# Patient Record
Sex: FEMALE | Race: WHITE | NOT HISPANIC OR LATINO | Employment: FULL TIME | URBAN - METROPOLITAN AREA
[De-identification: names, ages, dates, MRNs, and addresses within clinical notes are randomized per-mention and may not be internally consistent; named-entity substitution may affect disease eponyms.]

---

## 2023-03-17 ENCOUNTER — HOSPITAL ENCOUNTER (INPATIENT)
Facility: HOSPITAL | Age: 58
LOS: 1 days | Discharge: HOME | End: 2023-03-20
Attending: EMERGENCY MEDICINE | Admitting: INTERNAL MEDICINE

## 2023-03-17 ENCOUNTER — APPOINTMENT (OUTPATIENT)
Dept: RADIOLOGY | Facility: HOSPITAL | Age: 58
End: 2023-03-17

## 2023-03-17 DIAGNOSIS — J44.1 COPD EXACERBATION (HCC): Primary | ICD-10-CM

## 2023-03-17 DIAGNOSIS — Z72.0 NICOTINE ABUSE: ICD-10-CM

## 2023-03-17 PROBLEM — F12.90 MARIJUANA USE: Status: ACTIVE | Noted: 2023-03-17

## 2023-03-17 PROBLEM — I10 HYPERTENSION: Status: ACTIVE | Noted: 2023-03-17

## 2023-03-17 PROBLEM — E78.5 HYPERLIPIDEMIA: Status: ACTIVE | Noted: 2023-03-17

## 2023-03-17 LAB
2HR DELTA HS TROPONIN: -1 NG/L
ALBUMIN SERPL BCP-MCNC: 4.3 G/DL (ref 3.5–5)
ALP SERPL-CCNC: 73 U/L (ref 34–104)
ALT SERPL W P-5'-P-CCNC: 31 U/L (ref 7–52)
ANION GAP SERPL CALCULATED.3IONS-SCNC: 8 MMOL/L (ref 4–13)
AST SERPL W P-5'-P-CCNC: 23 U/L (ref 13–39)
BASOPHILS # BLD AUTO: 0.11 THOUSANDS/ÂΜL (ref 0–0.1)
BASOPHILS NFR BLD AUTO: 1 % (ref 0–1)
BILIRUB SERPL-MCNC: 0.68 MG/DL (ref 0.2–1)
BUN SERPL-MCNC: 14 MG/DL (ref 5–25)
CALCIUM SERPL-MCNC: 9.9 MG/DL (ref 8.4–10.2)
CARDIAC TROPONIN I PNL SERPL HS: 3 NG/L
CARDIAC TROPONIN I PNL SERPL HS: 4 NG/L
CHLORIDE SERPL-SCNC: 104 MMOL/L (ref 96–108)
CO2 SERPL-SCNC: 29 MMOL/L (ref 21–32)
CREAT SERPL-MCNC: 0.8 MG/DL (ref 0.6–1.3)
D DIMER PPP FEU-MCNC: 0.73 UG/ML FEU
EOSINOPHIL # BLD AUTO: 0.74 THOUSAND/ÂΜL (ref 0–0.61)
EOSINOPHIL NFR BLD AUTO: 7 % (ref 0–6)
ERYTHROCYTE [DISTWIDTH] IN BLOOD BY AUTOMATED COUNT: 13.2 % (ref 11.6–15.1)
FLUAV RNA RESP QL NAA+PROBE: NEGATIVE
FLUBV RNA RESP QL NAA+PROBE: NEGATIVE
GFR SERPL CREATININE-BSD FRML MDRD: 82 ML/MIN/1.73SQ M
GLUCOSE SERPL-MCNC: 88 MG/DL (ref 65–140)
HCT VFR BLD AUTO: 45.3 % (ref 34.8–46.1)
HGB BLD-MCNC: 15 G/DL (ref 11.5–15.4)
IMM GRANULOCYTES # BLD AUTO: 0.07 THOUSAND/UL (ref 0–0.2)
IMM GRANULOCYTES NFR BLD AUTO: 1 % (ref 0–2)
LYMPHOCYTES # BLD AUTO: 2.28 THOUSANDS/ÂΜL (ref 0.6–4.47)
LYMPHOCYTES NFR BLD AUTO: 22 % (ref 14–44)
MCH RBC QN AUTO: 32.4 PG (ref 26.8–34.3)
MCHC RBC AUTO-ENTMCNC: 33.1 G/DL (ref 31.4–37.4)
MCV RBC AUTO: 98 FL (ref 82–98)
MONOCYTES # BLD AUTO: 0.95 THOUSAND/ÂΜL (ref 0.17–1.22)
MONOCYTES NFR BLD AUTO: 9 % (ref 4–12)
NEUTROPHILS # BLD AUTO: 6.28 THOUSANDS/ÂΜL (ref 1.85–7.62)
NEUTS SEG NFR BLD AUTO: 60 % (ref 43–75)
NRBC BLD AUTO-RTO: 0 /100 WBCS
PLATELET # BLD AUTO: 314 THOUSANDS/UL (ref 149–390)
PMV BLD AUTO: 10.4 FL (ref 8.9–12.7)
POTASSIUM SERPL-SCNC: 4.2 MMOL/L (ref 3.5–5.3)
PROCALCITONIN SERPL-MCNC: <0.05 NG/ML
PROT SERPL-MCNC: 7.3 G/DL (ref 6.4–8.4)
RBC # BLD AUTO: 4.63 MILLION/UL (ref 3.81–5.12)
RSV RNA RESP QL NAA+PROBE: NEGATIVE
SARS-COV-2 RNA RESP QL NAA+PROBE: NEGATIVE
SODIUM SERPL-SCNC: 141 MMOL/L (ref 135–147)
WBC # BLD AUTO: 10.43 THOUSAND/UL (ref 4.31–10.16)

## 2023-03-17 RX ORDER — AZITHROMYCIN 250 MG/1
500 TABLET, FILM COATED ORAL EVERY 24 HOURS
Status: COMPLETED | OUTPATIENT
Start: 2023-03-17 | End: 2023-03-19

## 2023-03-17 RX ORDER — BENZONATATE 100 MG/1
100 CAPSULE ORAL 3 TIMES DAILY
Status: DISCONTINUED | OUTPATIENT
Start: 2023-03-17 | End: 2023-03-20 | Stop reason: HOSPADM

## 2023-03-17 RX ORDER — METHYLPREDNISOLONE SODIUM SUCCINATE 40 MG/ML
40 INJECTION, POWDER, LYOPHILIZED, FOR SOLUTION INTRAMUSCULAR; INTRAVENOUS EVERY 8 HOURS
Status: DISCONTINUED | OUTPATIENT
Start: 2023-03-18 | End: 2023-03-18

## 2023-03-17 RX ORDER — MAGNESIUM HYDROXIDE/ALUMINUM HYDROXICE/SIMETHICONE 120; 1200; 1200 MG/30ML; MG/30ML; MG/30ML
30 SUSPENSION ORAL EVERY 6 HOURS PRN
Status: DISCONTINUED | OUTPATIENT
Start: 2023-03-17 | End: 2023-03-20 | Stop reason: HOSPADM

## 2023-03-17 RX ORDER — AMLODIPINE BESYLATE 5 MG/1
5 TABLET ORAL DAILY
Status: DISCONTINUED | OUTPATIENT
Start: 2023-03-18 | End: 2023-03-20 | Stop reason: HOSPADM

## 2023-03-17 RX ORDER — LANOLIN ALCOHOL/MO/W.PET/CERES
3 CREAM (GRAM) TOPICAL
Status: DISCONTINUED | OUTPATIENT
Start: 2023-03-17 | End: 2023-03-18

## 2023-03-17 RX ORDER — ALBUTEROL SULFATE 90 UG/1
2 AEROSOL, METERED RESPIRATORY (INHALATION) EVERY 6 HOURS PRN
COMMUNITY

## 2023-03-17 RX ORDER — SODIUM CHLORIDE FOR INHALATION 0.9 %
3 VIAL, NEBULIZER (ML) INHALATION ONCE
Status: COMPLETED | OUTPATIENT
Start: 2023-03-17 | End: 2023-03-17

## 2023-03-17 RX ORDER — IPRATROPIUM BROMIDE AND ALBUTEROL SULFATE 2.5; .5 MG/3ML; MG/3ML
3 SOLUTION RESPIRATORY (INHALATION) ONCE
Status: COMPLETED | OUTPATIENT
Start: 2023-03-17 | End: 2023-03-17

## 2023-03-17 RX ORDER — ACETAMINOPHEN 325 MG/1
650 TABLET ORAL EVERY 6 HOURS PRN
Status: DISCONTINUED | OUTPATIENT
Start: 2023-03-17 | End: 2023-03-20 | Stop reason: HOSPADM

## 2023-03-17 RX ORDER — AMLODIPINE AND VALSARTAN 5; 320 MG/1; MG/1
1 TABLET ORAL DAILY
Status: DISCONTINUED | OUTPATIENT
Start: 2023-03-18 | End: 2023-03-17 | Stop reason: RX

## 2023-03-17 RX ORDER — MAGNESIUM SULFATE HEPTAHYDRATE 40 MG/ML
2 INJECTION, SOLUTION INTRAVENOUS ONCE
Status: COMPLETED | OUTPATIENT
Start: 2023-03-17 | End: 2023-03-18

## 2023-03-17 RX ORDER — LOSARTAN POTASSIUM 50 MG/1
100 TABLET ORAL DAILY
Status: DISCONTINUED | OUTPATIENT
Start: 2023-03-18 | End: 2023-03-20 | Stop reason: HOSPADM

## 2023-03-17 RX ORDER — BUDESONIDE 0.5 MG/2ML
0.5 INHALANT ORAL
Status: DISCONTINUED | OUTPATIENT
Start: 2023-03-17 | End: 2023-03-20 | Stop reason: HOSPADM

## 2023-03-17 RX ORDER — FLUTICASONE FUROATE, UMECLIDINIUM BROMIDE AND VILANTEROL TRIFENATATE 100; 62.5; 25 UG/1; UG/1; UG/1
1 POWDER RESPIRATORY (INHALATION) DAILY
COMMUNITY

## 2023-03-17 RX ORDER — GUAIFENESIN 600 MG/1
1200 TABLET, EXTENDED RELEASE ORAL 2 TIMES DAILY
Status: DISCONTINUED | OUTPATIENT
Start: 2023-03-18 | End: 2023-03-19

## 2023-03-17 RX ORDER — AMLODIPINE AND VALSARTAN 5; 320 MG/1; MG/1
1 TABLET ORAL DAILY
COMMUNITY

## 2023-03-17 RX ORDER — LEVALBUTEROL INHALATION SOLUTION 1.25 MG/3ML
1.25 SOLUTION RESPIRATORY (INHALATION)
Status: DISCONTINUED | OUTPATIENT
Start: 2023-03-17 | End: 2023-03-20 | Stop reason: HOSPADM

## 2023-03-17 RX ORDER — RIZATRIPTAN BENZOATE 10 MG/1
10 TABLET ORAL AS NEEDED
COMMUNITY

## 2023-03-17 RX ORDER — HYDROCODONE POLISTIREX AND CHLORPHENIRAMINE POLISTIREX 10; 8 MG/5ML; MG/5ML
5 SUSPENSION, EXTENDED RELEASE ORAL
Status: DISCONTINUED | OUTPATIENT
Start: 2023-03-17 | End: 2023-03-18

## 2023-03-17 RX ORDER — SODIUM CHLORIDE FOR INHALATION 0.9 %
3 VIAL, NEBULIZER (ML) INHALATION
Status: DISCONTINUED | OUTPATIENT
Start: 2023-03-17 | End: 2023-03-18

## 2023-03-17 RX ORDER — ENOXAPARIN SODIUM 100 MG/ML
40 INJECTION SUBCUTANEOUS 2 TIMES DAILY
Status: DISCONTINUED | OUTPATIENT
Start: 2023-03-18 | End: 2023-03-20 | Stop reason: HOSPADM

## 2023-03-17 RX ORDER — NICOTINE 21 MG/24HR
14 PATCH, TRANSDERMAL 24 HOURS TRANSDERMAL DAILY
Status: DISCONTINUED | OUTPATIENT
Start: 2023-03-18 | End: 2023-03-20 | Stop reason: HOSPADM

## 2023-03-17 RX ORDER — METHYLPREDNISOLONE SODIUM SUCCINATE 125 MG/2ML
125 INJECTION, POWDER, LYOPHILIZED, FOR SOLUTION INTRAMUSCULAR; INTRAVENOUS ONCE
Status: COMPLETED | OUTPATIENT
Start: 2023-03-17 | End: 2023-03-17

## 2023-03-17 RX ADMIN — IPRATROPIUM BROMIDE AND ALBUTEROL SULFATE 3 ML: 2.5; .5 SOLUTION RESPIRATORY (INHALATION) at 17:40

## 2023-03-17 RX ADMIN — BENZONATATE 100 MG: 100 CAPSULE ORAL at 22:39

## 2023-03-17 RX ADMIN — AZITHROMYCIN MONOHYDRATE 500 MG: 250 TABLET ORAL at 22:58

## 2023-03-17 RX ADMIN — ISODIUM CHLORIDE 3 ML: 0.03 SOLUTION RESPIRATORY (INHALATION) at 19:01

## 2023-03-17 RX ADMIN — MAGNESIUM SULFATE HEPTAHYDRATE 2 G: 40 INJECTION, SOLUTION INTRAVENOUS at 22:57

## 2023-03-17 RX ADMIN — IPRATROPIUM BROMIDE 0.5 MG: 0.5 SOLUTION RESPIRATORY (INHALATION) at 19:01

## 2023-03-17 RX ADMIN — METHYLPREDNISOLONE SODIUM SUCCINATE 125 MG: 125 INJECTION, POWDER, FOR SOLUTION INTRAMUSCULAR; INTRAVENOUS at 17:40

## 2023-03-17 RX ADMIN — ALBUTEROL SULFATE 10 MG: 2.5 SOLUTION RESPIRATORY (INHALATION) at 19:01

## 2023-03-17 NOTE — LETTER
[facility]  [facility] [address]  Dept: [phone]    March 20, 2023     Patient: Sharlett Holstein   YOB: 1965   Date of Visit: 3/17/2023       To Whom it May Concern:    Sharlett Holstein is under my professional care  She was seen in the hospital from 3/17/2023 to 03/20/23  She may return to work on 3/21/2023 without limitations  If you have any questions or concerns, please don't hesitate to call       33    Sincerely,          Mohan Patrick PA-C

## 2023-03-18 LAB
ANION GAP SERPL CALCULATED.3IONS-SCNC: 9 MMOL/L (ref 4–13)
ATRIAL RATE: 109 BPM
ATRIAL RATE: 93 BPM
BASOPHILS # BLD AUTO: 0.04 THOUSANDS/ÂΜL (ref 0–0.1)
BASOPHILS NFR BLD AUTO: 0 % (ref 0–1)
BUN SERPL-MCNC: 15 MG/DL (ref 5–25)
CALCIUM SERPL-MCNC: 9.4 MG/DL (ref 8.4–10.2)
CHLORIDE SERPL-SCNC: 104 MMOL/L (ref 96–108)
CO2 SERPL-SCNC: 23 MMOL/L (ref 21–32)
CREAT SERPL-MCNC: 0.75 MG/DL (ref 0.6–1.3)
EOSINOPHIL # BLD AUTO: 0.01 THOUSAND/ÂΜL (ref 0–0.61)
EOSINOPHIL NFR BLD AUTO: 0 % (ref 0–6)
ERYTHROCYTE [DISTWIDTH] IN BLOOD BY AUTOMATED COUNT: 13.2 % (ref 11.6–15.1)
GFR SERPL CREATININE-BSD FRML MDRD: 89 ML/MIN/1.73SQ M
GLUCOSE SERPL-MCNC: 220 MG/DL (ref 65–140)
HCT VFR BLD AUTO: 42.7 % (ref 34.8–46.1)
HGB BLD-MCNC: 14.2 G/DL (ref 11.5–15.4)
IMM GRANULOCYTES # BLD AUTO: 0.09 THOUSAND/UL (ref 0–0.2)
IMM GRANULOCYTES NFR BLD AUTO: 1 % (ref 0–2)
LYMPHOCYTES # BLD AUTO: 0.85 THOUSANDS/ÂΜL (ref 0.6–4.47)
LYMPHOCYTES NFR BLD AUTO: 8 % (ref 14–44)
MCH RBC QN AUTO: 32.3 PG (ref 26.8–34.3)
MCHC RBC AUTO-ENTMCNC: 33.3 G/DL (ref 31.4–37.4)
MCV RBC AUTO: 97 FL (ref 82–98)
MONOCYTES # BLD AUTO: 0.26 THOUSAND/ÂΜL (ref 0.17–1.22)
MONOCYTES NFR BLD AUTO: 2 % (ref 4–12)
NEUTROPHILS # BLD AUTO: 10.05 THOUSANDS/ÂΜL (ref 1.85–7.62)
NEUTS SEG NFR BLD AUTO: 89 % (ref 43–75)
NRBC BLD AUTO-RTO: 0 /100 WBCS
P AXIS: 62 DEGREES
P AXIS: 72 DEGREES
PLATELET # BLD AUTO: 319 THOUSANDS/UL (ref 149–390)
PMV BLD AUTO: 10.8 FL (ref 8.9–12.7)
POTASSIUM SERPL-SCNC: 4.3 MMOL/L (ref 3.5–5.3)
PR INTERVAL: 138 MS
PR INTERVAL: 142 MS
PROCALCITONIN SERPL-MCNC: <0.05 NG/ML
QRS AXIS: 66 DEGREES
QRS AXIS: 76 DEGREES
QRSD INTERVAL: 88 MS
QRSD INTERVAL: 90 MS
QT INTERVAL: 350 MS
QT INTERVAL: 366 MS
QTC INTERVAL: 455 MS
QTC INTERVAL: 471 MS
RBC # BLD AUTO: 4.39 MILLION/UL (ref 3.81–5.12)
SODIUM SERPL-SCNC: 136 MMOL/L (ref 135–147)
T WAVE AXIS: 59 DEGREES
T WAVE AXIS: 71 DEGREES
VENTRICULAR RATE: 109 BPM
VENTRICULAR RATE: 93 BPM
WBC # BLD AUTO: 11.3 THOUSAND/UL (ref 4.31–10.16)

## 2023-03-18 RX ORDER — METHYLPREDNISOLONE SODIUM SUCCINATE 125 MG/2ML
60 INJECTION, POWDER, LYOPHILIZED, FOR SOLUTION INTRAMUSCULAR; INTRAVENOUS EVERY 8 HOURS
Status: DISCONTINUED | OUTPATIENT
Start: 2023-03-18 | End: 2023-03-19

## 2023-03-18 RX ORDER — LANOLIN ALCOHOL/MO/W.PET/CERES
6 CREAM (GRAM) TOPICAL
Status: DISCONTINUED | OUTPATIENT
Start: 2023-03-18 | End: 2023-03-20 | Stop reason: HOSPADM

## 2023-03-18 RX ORDER — HYDROCODONE POLISTIREX AND CHLORPHENIRAMINE POLISTIREX 10; 8 MG/5ML; MG/5ML
5 SUSPENSION, EXTENDED RELEASE ORAL EVERY 8 HOURS PRN
Status: DISCONTINUED | OUTPATIENT
Start: 2023-03-18 | End: 2023-03-20 | Stop reason: HOSPADM

## 2023-03-18 RX ADMIN — IPRATROPIUM BROMIDE 0.5 MG: 0.5 SOLUTION RESPIRATORY (INHALATION) at 14:27

## 2023-03-18 RX ADMIN — ENOXAPARIN SODIUM 40 MG: 40 INJECTION SUBCUTANEOUS at 18:32

## 2023-03-18 RX ADMIN — METHYLPREDNISOLONE SODIUM SUCCINATE 40 MG: 40 INJECTION, POWDER, FOR SOLUTION INTRAMUSCULAR; INTRAVENOUS at 05:00

## 2023-03-18 RX ADMIN — LEVALBUTEROL HYDROCHLORIDE 1.25 MG: 1.25 SOLUTION RESPIRATORY (INHALATION) at 07:50

## 2023-03-18 RX ADMIN — LEVALBUTEROL HYDROCHLORIDE 1.25 MG: 1.25 SOLUTION RESPIRATORY (INHALATION) at 00:07

## 2023-03-18 RX ADMIN — BUDESONIDE 0.5 MG: 0.5 INHALANT ORAL at 07:50

## 2023-03-18 RX ADMIN — HYDROCODONE POLISTIREX AND CHLORPHENIRAMINE POLISTIREX 5 ML: 10; 8 SUSPENSION, EXTENDED RELEASE ORAL at 01:19

## 2023-03-18 RX ADMIN — MELATONIN 3 MG: at 01:19

## 2023-03-18 RX ADMIN — IPRATROPIUM BROMIDE 0.5 MG: 0.5 SOLUTION RESPIRATORY (INHALATION) at 20:26

## 2023-03-18 RX ADMIN — GUAIFENESIN 1200 MG: 600 TABLET ORAL at 08:46

## 2023-03-18 RX ADMIN — HYDROCODONE POLISTIREX AND CHLORPHENIRAMINE POLISTIREX 5 ML: 10; 8 SUSPENSION, EXTENDED RELEASE ORAL at 14:00

## 2023-03-18 RX ADMIN — BENZONATATE 100 MG: 100 CAPSULE ORAL at 08:46

## 2023-03-18 RX ADMIN — MULTIPLE VITAMINS W/ MINERALS TAB 1 TABLET: TAB ORAL at 08:46

## 2023-03-18 RX ADMIN — ENOXAPARIN SODIUM 40 MG: 40 INJECTION SUBCUTANEOUS at 08:45

## 2023-03-18 RX ADMIN — IPRATROPIUM BROMIDE 0.5 MG: 0.5 SOLUTION RESPIRATORY (INHALATION) at 07:49

## 2023-03-18 RX ADMIN — Medication 14 MG: at 08:47

## 2023-03-18 RX ADMIN — BUDESONIDE 0.5 MG: 0.5 INHALANT ORAL at 20:26

## 2023-03-18 RX ADMIN — IPRATROPIUM BROMIDE 0.5 MG: 0.5 SOLUTION RESPIRATORY (INHALATION) at 00:06

## 2023-03-18 RX ADMIN — BENZONATATE 100 MG: 100 CAPSULE ORAL at 15:18

## 2023-03-18 RX ADMIN — GUAIFENESIN 1200 MG: 600 TABLET ORAL at 18:32

## 2023-03-18 RX ADMIN — AMLODIPINE BESYLATE 5 MG: 5 TABLET ORAL at 08:46

## 2023-03-18 RX ADMIN — AZITHROMYCIN MONOHYDRATE 500 MG: 250 TABLET ORAL at 21:21

## 2023-03-18 RX ADMIN — LEVALBUTEROL HYDROCHLORIDE 1.25 MG: 1.25 SOLUTION RESPIRATORY (INHALATION) at 14:27

## 2023-03-18 RX ADMIN — BENZONATATE 100 MG: 100 CAPSULE ORAL at 21:21

## 2023-03-18 RX ADMIN — METHYLPREDNISOLONE SODIUM SUCCINATE 60 MG: 125 INJECTION, POWDER, FOR SOLUTION INTRAMUSCULAR; INTRAVENOUS at 21:21

## 2023-03-18 RX ADMIN — LEVALBUTEROL HYDROCHLORIDE 1.25 MG: 1.25 SOLUTION RESPIRATORY (INHALATION) at 20:26

## 2023-03-18 RX ADMIN — LOSARTAN POTASSIUM 100 MG: 50 TABLET, FILM COATED ORAL at 08:46

## 2023-03-18 RX ADMIN — ISODIUM CHLORIDE 3 ML: 0.03 SOLUTION RESPIRATORY (INHALATION) at 00:06

## 2023-03-18 RX ADMIN — METHYLPREDNISOLONE SODIUM SUCCINATE 60 MG: 125 INJECTION, POWDER, FOR SOLUTION INTRAMUSCULAR; INTRAVENOUS at 13:59

## 2023-03-18 NOTE — ASSESSMENT & PLAN NOTE
Admitted with sudden onset of shortness of breath on 317  She had no relief with Trelegy or albuterol nebs at home  Her chest x-ray was unremarkable on admission and she was started on IV Solu-Medrol     · Continue IV Solu-Medrol  · Increased frequency of cough suppressant as this is her biggest challenge currently  · She remains off of supplemental oxygen  · Viral swab negative

## 2023-03-18 NOTE — PLAN OF CARE
Problem: RESPIRATORY - ADULT  Goal: Achieves optimal ventilation and oxygenation  Description: INTERVENTIONS:  - Assess for changes in respiratory status  - Assess for changes in mentation and behavior  - Position to facilitate oxygenation and minimize respiratory effort  - Oxygen administered by appropriate delivery if ordered  - Initiate smoking cessation education as indicated  - Encourage broncho-pulmonary hygiene including cough, deep breathe, Incentive Spirometry  - Assess the need for suctioning and aspirate as needed  - Assess and instruct to report SOB or any respiratory difficulty  - Respiratory Therapy support as indicated  Outcome: Progressing     Problem: PAIN - ADULT  Goal: Verbalizes/displays adequate comfort level or baseline comfort level  Description: Interventions:  - Encourage patient to monitor pain and request assistance  - Assess pain using appropriate pain scale  - Administer analgesics based on type and severity of pain and evaluate response  - Implement non-pharmacological measures as appropriate and evaluate response  - Consider cultural and social influences on pain and pain management  - Notify physician/advanced practitioner if interventions unsuccessful or patient reports new pain  Outcome: Progressing     Problem: INFECTION - ADULT  Goal: Absence or prevention of progression during hospitalization  Description: INTERVENTIONS:  - Assess and monitor for signs and symptoms of infection  - Monitor lab/diagnostic results  - Monitor all insertion sites, i e  indwelling lines, tubes, and drains  - [place] appropriate cooling/warming therapies per order  - Administer medications as ordered  - Instruct and encourage patient and family to use good hand hygiene technique  - Identify and instruct in appropriate isolation precautions for identified infection/condition  Outcome: Progressing

## 2023-03-18 NOTE — ASSESSMENT & PLAN NOTE
· Symptom onset 3/17  No relief with trelegy, albuterol nebs at home  · CXR negative  · IV Solu-Medrol: 40mg q8  • Respiratory protocol, nebs     o Xopenex/Atrovent: TID  o Budesonide BID  • Azithromycin x 3 days  • Give IV mag x 1 dose now  • PTA: trelegy PRN, albuterol neb PRN, albuterol inhaler PRN  • Consider pulmonology eval if no improvement

## 2023-03-18 NOTE — PROGRESS NOTES
[facility]  Progress Note - Eugenia Bishop 1965, 66 y o  female MRN: [id number]  Unit/Bed#: S -01 Encounter: [id number]  Primary Care Provider: No primary care provider on file  Date and time admitted to hospital: 3/17/2023  5:18 PM    * Acute exacerbation of chronic obstructive pulmonary disease (COPD) ([facility] [address] )  Assessment & Plan  Admitted with sudden onset of shortness of breath on 317  She had no relief with Trelegy or albuterol nebs at home  Her chest x-ray was unremarkable on admission and she was started on IV Solu-Medrol  · Continue IV Solu-Medrol  · Increased frequency of cough suppressant as this is her biggest challenge currently  · She remains off of supplemental oxygen  · Viral swab negative    Hyperlipidemia  Assessment & Plan  Does not tolerate statins per outpatient notes  Nicotine abuse  Assessment & Plan  Continue replacement therapy    Hypertension  Assessment & Plan  Blood pressure stable on amlodipine and valsartan        VTE Pharmacologic Prophylaxis: VTE Score: 4 Moderate Risk (Score 3-4) - Pharmacological DVT Prophylaxis Ordered: enoxaparin (Lovenox)  Patient Centered Rounds: I performed bedside rounds with nursing staff today  Discussions with Specialists or Other Care Team Provider: ER    Education and Discussions with Family / Patient: Updated  () at bedside  Total Time Spent on Date of Encounter in care of patient: 35 minutes This time was spent on one or more of the following: performing physical exam; counseling and coordination of care; obtaining or reviewing history; documenting in the medical record; reviewing/ordering tests, medications or procedures; communicating with other healthcare professionals and discussing with patient's family/caregivers      Current Length of Stay: 0 day(s)  Current Patient Status: Observation   Certification Statement: The patient will continue to require additional inpatient hospital stay due to COPD exacerbation  Discharge Plan: Anticipate discharge in 24-48 hrs to home  Code Status: Level 1 - Full Code    Subjective:   She reports continued shortness of breath without relief overnight  She reports insomnia  She notes that she has continuous cough with taking deep breaths  She denies chest pain  She denies abdominal pain  She denies fevers or chills  She has no nasal congestion  Objective:     Vitals:   Temp (24hrs), Av °F (36 7 °C), Min:97 5 °F (36 4 °C), Max:98 4 °F (36 9 °C)    Temp:  [97 5 °F (36 4 °C)-98 4 °F (36 9 °C)] 98 °F (36 7 °C)  HR:  [] 116  Resp:  [16-20] 17  BP: (108-159)/(68-83) 108/70  SpO2:  [93 %-98 %] 95 %  Body mass index is 41 71 kg/m²  Input and Output Summary (last 24 hours):   No intake or output data in the 24 hours ending 23 1518    Physical Exam:   Physical Exam  Vitals and nursing note reviewed  Constitutional:       General: She is in acute distress  Appearance: Normal appearance  She is obese  HENT:      Head: Normocephalic  Nose: Nose normal  No congestion  Mouth/Throat:      Mouth: Mucous membranes are moist       Pharynx: Oropharynx is clear  Eyes:      General: No scleral icterus  Conjunctiva/sclera: Conjunctivae normal    Pulmonary:      Effort: Pulmonary effort is normal       Breath sounds: Wheezing present  Abdominal:      General: Bowel sounds are normal  There is no distension  Palpations: Abdomen is soft  Tenderness: There is no abdominal tenderness  Skin:     General: Skin is warm  Neurological:      Mental Status: She is alert and oriented to person, place, and time     Psychiatric:         Mood and Affect: Mood normal          Behavior: Behavior normal           Additional Data:     Labs:  Results from last 7 days   Lab Units 23  0424   WBC Thousand/uL 11 30*   HEMOGLOBIN g/dL 14 2   HEMATOCRIT % 42 7   PLATELETS Thousands/uL 319   NEUTROS PCT % 89*   LYMPHS PCT % 8* MONOS PCT % 2*   EOS PCT % 0     Results from last 7 days   Lab Units 03/18/23  0424 03/17/23  1615   SODIUM mmol/L 136 141   POTASSIUM mmol/L 4 3 4 2   CHLORIDE mmol/L 104 104   CO2 mmol/L 23 29   BUN mg/dL 15 14   CREATININE mg/dL 0 75 0 80   ANION GAP mmol/L 9 8   CALCIUM mg/dL 9 4 9 9   ALBUMIN g/dL  --  4 3   TOTAL BILIRUBIN mg/dL  --  0 68   ALK PHOS U/L  --  73   ALT U/L  --  31   AST U/L  --  23   GLUCOSE RANDOM mg/dL 220* 88                 Results from last 7 days   Lab Units 03/18/23  0424 03/17/23  1615   PROCALCITONIN ng/ml <0 05 <0 05       Lines/Drains:  Invasive Devices     Peripheral Intravenous Line  Duration           Peripheral IV 03/17/23 Right Antecubital <1 day                      Imaging: Personally reviewed the following imaging: chest xray    Recent Cultures (last 7 days):         Last 24 Hours Medication List:   Current Facility-Administered Medications   Medication Dose Route Frequency Provider Last Rate   • acetaminophen  650 mg Oral Q6H PRN DESMOND Leyva     • aluminum-magnesium hydroxide-simethicone  30 mL Oral Q6H PRN DESMOND Leyva     • losartan  100 mg Oral Daily DESMOND Leyva      And   • amLODIPine  5 mg Oral Daily DESMOND Leyva     • azithromycin  500 mg Oral Q24H DESMOND Leyva     • benzonatate  100 mg Oral TID DESMOND Leyva     • budesonide  0 5 mg Nebulization Q12H DESMOND Leyva     • enoxaparin  40 mg Subcutaneous BID DESMOND Leyva     • guaiFENesin  1,200 mg Oral BID DESMOND Leyva     • Hydrocod Hieu-Chlorphe Hieu ER  5 mL Oral Q8H PRN Dionna Dubose MD     • ipratropium  0 5 mg Nebulization TID DESMOND Leyva     • levalbuterol  1 25 mg Nebulization TID DESMOND Leyva     • melatonin  6 mg Oral HS PRN Dionna Dubose MD     • methylPREDNISolone sodium succinate  60 mg Intravenous Q8H Dionna Dubose MD     • multivitamin-minerals  1 tablet Oral Daily DESMOND Leyva     • nicotine  14 mg Transdermal Daily Irma Fair DESMOND          Today, Patient Was Seen By: Julio Pruitt MD    **Please Note: This note may have been constructed using a voice recognition system  **

## 2023-03-18 NOTE — H&P
[facility]  H&P- Niesha Reed 1965, 66 y o  female MRN: [id number]  Unit/Bed#: S -01 Encounter: [id number]  Primary Care Provider: No primary care provider on file  Date and time admitted to hospital: 3/17/2023  5:18 PM    * Acute exacerbation of chronic obstructive pulmonary disease (COPD) ([facility] [address] )  Assessment & Plan  · Symptom onset 3/17  No relief with trelegy, albuterol nebs at home  · CXR negative  · IV Solu-Medrol: 40mg q8  • Respiratory protocol, nebs  o Xopenex/Atrovent: TID  o Budesonide BID  • Azithromycin x 3 days  • Give IV mag x 1 dose now  • PTA: trelegy PRN, albuterol neb PRN, albuterol inhaler PRN  • Consider pulmonology eval if no improvement      Hyperlipidemia  Assessment & Plan  Does not tolerate statins per outpatient notes  Follow up with PCP    Nicotine abuse  Assessment & Plan  NRT, cessation    Hypertension  Assessment & Plan  BP stable on amlodipine-valsartan    VTE Pharmacologic Prophylaxis: VTE Score: 4 Moderate Risk (Score 3-4) - Pharmacological DVT Prophylaxis Ordered: enoxaparin (Lovenox)  Code Status: No Order full  Discussion with family: Patient declined call to   Anticipated Length of Stay: Patient will be admitted on an observation basis with an anticipated length of stay of less than 2 midnights secondary to IV steroids  Total Time Spent on Date of Encounter in care of patient: 75 minutes This time was spent on one or more of the following: performing physical exam; counseling and coordination of care; obtaining or reviewing history; documenting in the medical record; reviewing/ordering tests, medications or procedures; communicating with other healthcare professionals and discussing with patient's family/caregivers      Chief Complaint: shortness of breath    History of Present Illness:  Niesha Reed is a 66 y o  female with a PMH of HTN, HLD, obesity, nicotine abuse, marijuana use, who presents with shortness of breath, wheezing, coughing that began Tuesday  Resumed use of trelegy and nebulizers when she began feeling unwell on Tuesday but had no relief  Denies fevers, chest pain, palpitations, n/v/d/abdominal pain, urinary symptoms, lower extremity edema, calf tenderness  COVID, flu, RSV negative  Smokes 1 pack every 2-3 days  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, naphtha, alcohol  Has two dogs at home  She last required course of prednisone 2/8-2/16 for COPD exacerbation  Review of Systems:  Review of Systems   Respiratory: Positive for cough, shortness of breath and wheezing  Psychiatric/Behavioral: Positive for sleep disturbance  All other systems reviewed and are negative  Past Medical and Surgical History:   Past Medical History:   Diagnosis Date   • Hypertension        History reviewed  No pertinent surgical history  Meds/Allergies:  Prior to Admission medications    Not on File     I have reviewed home medications with a medical source (PCP, Pharmacy, other)  Allergies: Allergies   Allergen Reactions   • Penicillins Throat Swelling       Social History:  Marital Status: /Civil Union   Occupation:   Patient Pre-hospital Living Situation: Home  Patient Pre-hospital Level of Mobility: walks  Patient Pre-hospital Diet Restrictions:   Substance Use History:   Social History     Substance and Sexual Activity   Alcohol Use Yes    Comment: social     Social History     Tobacco Use   Smoking Status Every Day   • Packs/day: 1 50   • Types: Cigarettes   Smokeless Tobacco Never     Social History     Substance and Sexual Activity   Drug Use Yes   • Types: Marijuana       Family History:  History reviewed  No pertinent family history      Physical Exam:     Vitals:   Blood Pressure: 141/83 (03/17/23 1900)  Pulse: 95 (03/17/23 1900)  Temperature: 98 4 °F (36 9 °C) (03/17/23 1604)  Temp Source: Oral (03/17/23 1604)  Respirations: 16 (03/17/23 1900)  Height: 5' 3" (160 cm) (03/17/23 1605)  Weight - Scale: 107 kg (235 lb 7 2 oz) (03/17/23 1605)  SpO2: 96 % (03/17/23 1900)    Physical Exam  Constitutional:       General: She is not in acute distress  Appearance: Normal appearance  She is obese  She is not ill-appearing  HENT:      Head: Normocephalic and atraumatic  Right Ear: External ear normal       Left Ear: External ear normal       Nose: Nose normal       Mouth/Throat:      Pharynx: Oropharynx is clear  Eyes:      General: No scleral icterus  Extraocular Movements: Extraocular movements intact  Conjunctiva/sclera: Conjunctivae normal    Cardiovascular:      Rate and Rhythm: Normal rate and regular rhythm  Pulses: Normal pulses  Heart sounds: Normal heart sounds  Pulmonary:      Effort: No respiratory distress  Breath sounds: Wheezing (expiratory wheezes throughout lung fields) present  No rhonchi or rales  Comments: Room air  Chest:      Chest wall: No tenderness  Abdominal:      General: Bowel sounds are normal       Palpations: Abdomen is soft  Musculoskeletal:         General: No tenderness  Normal range of motion  Cervical back: Normal range of motion  Right lower leg: No edema  Left lower leg: No edema  Skin:     General: Skin is warm  Capillary Refill: Capillary refill takes less than 2 seconds  Neurological:      General: No focal deficit present  Mental Status: She is alert and oriented to person, place, and time     Psychiatric:         Mood and Affect: Mood normal          Behavior: Behavior normal           Additional Data:     Lab Results:  Results from last 7 days   Lab Units 03/17/23  1615   WBC Thousand/uL 10 43*   HEMOGLOBIN g/dL 15 0   HEMATOCRIT % 45 3   PLATELETS Thousands/uL 314   NEUTROS PCT % 60   LYMPHS PCT % 22   MONOS PCT % 9   EOS PCT % 7*     Results from last 7 days   Lab Units 03/17/23  1615   SODIUM mmol/L 141   POTASSIUM mmol/L 4 2   CHLORIDE mmol/L 104   CO2 mmol/L 29   BUN mg/dL 14   CREATININE mg/dL 0 80   ANION GAP mmol/L 8   CALCIUM mg/dL 9 9   ALBUMIN g/dL 4 3   TOTAL BILIRUBIN mg/dL 0 68   ALK PHOS U/L 73   ALT U/L 31   AST U/L 23   GLUCOSE RANDOM mg/dL 88                       Lines/Drains:  Invasive Devices     Peripheral Intravenous Line  Duration           Peripheral IV 03/17/23 Right Antecubital <1 day                    Imaging: Reviewed radiology reports from this admission including: chest xray  XR chest 1 view portable    (Results Pending)       EKG and Other Studies Reviewed on Admission:   · EKG: Sinus Tachycardia    ** Please Note: This note has been constructed using a voice recognition system   **

## 2023-03-18 NOTE — ED PROVIDER NOTES
History  Chief Complaint   Patient presents with   • Shortness of Breath     PT presents with SOB that started Tuesday with a non productive cough  PT can be heard wheezing in triage     HPI     82-year-old female presenting to the emergency department with wheezing and nonproductive cough that has only gotten worse over the past 3 days  Past medical history significant for hypertension and suspected COPD  Patient has been using her inhalers and nebulizations that she has been prescribed without proven in her symptoms  Also complains of a sharp midsternal chest pain and shortness of breath  Denies fever, chills, nausea, vomiting, abdominal pain, urinary symptoms, changes in stool, leg pain or leg swelling, rash  Prior to Admission Medications   Prescriptions Last Dose Informant Patient Reported? Taking? Calcium Citrate-Vitamin D (CALCIUM + D PO) Not Taking  Yes No   Sig: Take by mouth   Patient not taking: Reported on 3/17/2023   Multiple Vitamins-Minerals (WOMENS MULTIVITAMIN PO)   Yes Yes   Sig: Take by mouth   albuterol (PROVENTIL HFA,VENTOLIN HFA) 90 mcg/act inhaler   Yes Yes   Sig: Inhale 2 puffs every 6 (six) hours as needed for wheezing   amLODIPine-valsartan (EXFORGE) 5-320 MG per tablet   Yes Yes   Sig: Take 1 tablet by mouth daily   fluticasone-umeclidinium-vilanterol (Trelegy Ellipta) 100-62 5-25 mcg/actuation inhaler   Yes Yes   Sig: Inhale 1 puff daily Rinse mouth after use  rizatriptan (MAXALT) 10 mg tablet   Yes Yes   Sig: Take 10 mg by mouth as needed for migraine Take at the onset of migraine; if symptoms continue or return, may take another dose at least 2 hours after first dose  Take no more than 2 doses in a day  Facility-Administered Medications: None       Past Medical History:   Diagnosis Date   • Hypertension        History reviewed  No pertinent surgical history  History reviewed  No pertinent family history    I have reviewed and agree with the history as documented  E-Cigarette/Vaping   • E-Cigarette Use Never User      E-Cigarette/Vaping Substances     Social History     Tobacco Use   • Smoking status: Every Day     Packs/day: 1 50     Types: Cigarettes   • Smokeless tobacco: Never   Vaping Use   • Vaping Use: Never used   Substance Use Topics   • Alcohol use: Yes     Comment: social   • Drug use: Yes     Types: Marijuana       Review of Systems   Constitutional: Negative for chills and fever  Respiratory: Positive for cough (Nonproductive), chest tightness, shortness of breath and wheezing  Negative for apnea, choking and stridor  Cardiovascular: Positive for chest pain  Negative for palpitations and leg swelling  Gastrointestinal: Negative for abdominal distention, abdominal pain, constipation, diarrhea, nausea, rectal pain and vomiting  Genitourinary: Negative for dysuria and hematuria  Musculoskeletal: Negative for back pain, gait problem and neck pain  Skin: Negative for color change, pallor, rash and wound  Neurological: Negative for dizziness, tremors, seizures, syncope, facial asymmetry, speech difficulty, weakness, light-headedness, numbness and headaches  Physical Exam  Physical Exam  Vitals and nursing note reviewed  Constitutional:       General: She is not in acute distress  Appearance: She is well-developed  She is not ill-appearing, toxic-appearing or diaphoretic  HENT:      Head: Normocephalic and atraumatic  Right Ear: External ear normal       Left Ear: External ear normal       Nose: Nose normal    Eyes:      General:         Right eye: No discharge  Left eye: No discharge  Conjunctiva/sclera: Conjunctivae normal       Pupils: Pupils are equal, round, and reactive to light  Cardiovascular:      Rate and Rhythm: Normal rate and regular rhythm  Heart sounds: Normal heart sounds  No murmur heard  No friction rub  No gallop     Pulmonary:      Effort: Tachypnea, accessory muscle usage and respiratory distress present  Breath sounds: No stridor  Wheezing present  No decreased breath sounds, rhonchi or rales  Chest:      Chest wall: No tenderness  Abdominal:      General: Bowel sounds are normal  There is no distension  Palpations: Abdomen is soft  There is no mass  Tenderness: There is no abdominal tenderness  There is no guarding or rebound  Hernia: No hernia is present  Musculoskeletal:         General: No tenderness or deformity  Normal range of motion  Cervical back: Normal range of motion and neck supple  Skin:     General: Skin is warm and dry  Capillary Refill: Capillary refill takes less than 2 seconds  Neurological:      Mental Status: She is alert and oriented to person, place, and time     Psychiatric:         Mood and Affect: Mood normal          Vital Signs  ED Triage Vitals [03/17/23 1604]   Temperature Pulse Respirations Blood Pressure SpO2   98 4 °F (36 9 °C) 104 20 133/76 94 %      Temp Source Heart Rate Source Patient Position - Orthostatic VS BP Location FiO2 (%)   Oral Monitor Sitting Right arm --      Pain Score       --           Vitals:    03/17/23 1604 03/17/23 1744 03/17/23 1800 03/17/23 1900   BP: 133/76 159/68 142/77 141/83   Pulse: 104 96 102 95   Patient Position - Orthostatic VS: Sitting Sitting Lying Lying         Visual Acuity      ED Medications  Medications   multivitamin-minerals (CENTRUM) tablet 1 tablet (has no administration in time range)   acetaminophen (TYLENOL) tablet 650 mg (has no administration in time range)   aluminum-magnesium hydroxide-simethicone (MYLANTA) oral suspension 30 mL (has no administration in time range)   melatonin tablet 3 mg (has no administration in time range)   guaiFENesin (MUCINEX) 12 hr tablet 1,200 mg (has no administration in time range)   levalbuterol (XOPENEX) inhalation solution 1 25 mg (has no administration in time range)     And   sodium chloride 0 9 % inhalation solution 3 mL (has no administration in time range)   ipratropium (ATROVENT) 0 02 % inhalation solution 0 5 mg (has no administration in time range)   budesonide (PULMICORT) inhalation solution 0 5 mg (has no administration in time range)   methylPREDNISolone sodium succinate (Solu-MEDROL) injection 40 mg (has no administration in time range)   azithromycin (ZITHROMAX) tablet 500 mg (500 mg Oral Given 3/17/23 2258)   enoxaparin (LOVENOX) subcutaneous injection 40 mg (has no administration in time range)   magnesium sulfate 2 g/50 mL IVPB (premix) 2 g (2 g Intravenous New Bag 3/17/23 2257)   benzonatate (TESSALON PERLES) capsule 100 mg (100 mg Oral Given 3/17/23 2239)   Hydrocod Hieu-Chlorphe Hieu ER (TUSSIONEX) ER suspension 5 mL (has no administration in time range)   nicotine (NICODERM CQ) 14 mg/24hr TD 24 hr patch 14 mg (has no administration in time range)   losartan (COZAAR) tablet 100 mg (has no administration in time range)     And   amLODIPine (NORVASC) tablet 5 mg (has no administration in time range)   methylPREDNISolone sodium succinate (Solu-MEDROL) injection 125 mg (125 mg Intravenous Given 3/17/23 1740)   ipratropium-albuterol (DUO-NEB) 0 5-2 5 mg/3 mL inhalation solution 3 mL (3 mL Nebulization Given 3/17/23 1740)   albuterol inhalation solution 10 mg (10 mg Nebulization Given 3/17/23 1901)     And   ipratropium (ATROVENT) 0 02 % inhalation solution 0 5 mg (0 5 mg Nebulization Given 3/17/23 1901)     And   sodium chloride 0 9 % inhalation solution 3 mL (3 mL Nebulization Given 3/17/23 1901)       Diagnostic Studies  Results Reviewed     Procedure Component Value Units Date/Time    HS Troponin I 2hr [[id number]]  (Normal) Collected: 03/17/23 1859    Lab Status: Final result Specimen: Blood from Arm, Right Updated: 03/17/23 1939     hs TnI 2hr 3 ng/L      Delta 2hr hsTnI -1 ng/L     D-Dimer [[id number]]  (Abnormal) Collected: 03/17/23 1736    Lab Status: Final result Specimen: Blood from Arm, Right Updated: 03/17/23 0244 D-Dimer, Quant 0 73 ug/ml FEU     Narrative: In the evaluation for possible pulmonary embolism, in the appropriate (Well's Score of 4 or less) patient, the age adjusted d-dimer cutoff for this patient can be calculated as:    Age x 0 01 (in ug/mL) for Age-adjusted D-dimer exclusion threshold for a patient over 50 years  FLU/RSV/COVID - if FLU/RSV clinically relevant [[id number]]  (Normal) Collected: 03/17/23 1736    Lab Status: Final result Specimen: Nares from Nose Updated: 03/17/23 1827     SARS-CoV-2 Negative     INFLUENZA A PCR Negative     INFLUENZA B PCR Negative     RSV PCR Negative    Narrative:      FOR PEDIATRIC PATIENTS - copy/paste COVID Guidelines URL to browser: [email or web address]  [employer]    SARS-CoV-2 assay is a Nucleic Acid Amplification assay intended for the  qualitative detection of nucleic acid from SARS-CoV-2 in nasopharyngeal  swabs  Results are for the presumptive identification of SARS-CoV-2 RNA  Positive results are indicative of infection with SARS-CoV-2, the virus  causing COVID-19, but do not rule out bacterial infection or co-infection  with other viruses  Laboratories within the [place] and its  territories are required to report all positive results to the appropriate  public health authorities  Negative results do not preclude SARS-CoV-2  infection and should not be used as the sole basis for treatment or other  patient management decisions  Negative results must be combined with  clinical observations, patient history, and epidemiological information  This test has not been FDA cleared or approved  This test has been authorized by FDA under an Emergency Use Authorization  (EUA)   This test is only authorized for the duration of time the  declaration that circumstances exist justifying the authorization of the  emergency use of an in vitro diagnostic tests for detection of SARS-CoV-2  virus and/or diagnosis of COVID-19 infection under section 564(b)(1) of  the Act, 21 U  S C  321ONC-6(Y)(2), unless the authorization is terminated  or revoked sooner  The test has been validated but independent review by FDA  and CLIA is pending  Test performed using [employer] GeneXpert: This RT-PCR assay targets N2,  a region unique to SARS-CoV-2  A conserved region in the E-gene was chosen  for pan-Sarbecovirus detection which includes SARS-CoV-2  According to CMS-2020-01-R, this platform meets the definition of high-throughput technology      HS Troponin 0hr (reflex protocol) [[id number]]  (Normal) Collected: 03/17/23 1615    Lab Status: Final result Specimen: Blood from Arm, Right Updated: 03/17/23 1703     hs TnI 0hr 4 ng/L     Comprehensive metabolic panel [[id number]] Collected: 03/17/23 1615    Lab Status: Final result Specimen: Blood from Arm, Right Updated: 03/17/23 1650     Sodium 141 mmol/L      Potassium 4 2 mmol/L      Chloride 104 mmol/L      CO2 29 mmol/L      ANION GAP 8 mmol/L      BUN 14 mg/dL      Creatinine 0 80 mg/dL      Glucose 88 mg/dL      Calcium 9 9 mg/dL      AST 23 U/L      ALT 31 U/L      Alkaline Phosphatase 73 U/L      Total Protein 7 3 g/dL      Albumin 4 3 g/dL      Total Bilirubin 0 68 mg/dL      eGFR 82 ml/min/1 73sq m     Narrative:      [place] guidelines for Chronic Kidney Disease (CKD):   •  Stage 1 with normal or high GFR (GFR > 90 mL/min/1 73 square meters)  •  Stage 2 Mild CKD (GFR = 60-89 mL/min/1 73 square meters)  •  Stage 3A Moderate CKD (GFR = 45-59 mL/min/1 73 square meters)  •  Stage 3B Moderate CKD (GFR = 30-44 mL/min/1 73 square meters)  •  Stage 4 Severe CKD (GFR = 15-29 mL/min/1 73 square meters)  •  Stage 5 End Stage CKD (GFR <15 mL/min/1 73 square meters)  Note: GFR calculation is accurate only with a steady state creatinine    CBC and differential [[id number]]  (Abnormal) Collected: 03/17/23 1615    Lab Status: Final result Specimen: Blood from Arm, Right Updated: 03/17/23 1628     WBC 10 43 Thousand/uL      RBC 4 63 Million/uL      Hemoglobin 15 0 g/dL      Hematocrit 45 3 %      MCV 98 fL      MCH 32 4 pg      MCHC 33 1 g/dL      RDW 13 2 %      MPV 10 4 fL      Platelets 803 Thousands/uL      nRBC 0 /100 WBCs      Neutrophils Relative 60 %      Immat GRANS % 1 %      Lymphocytes Relative 22 %      Monocytes Relative 9 %      Eosinophils Relative 7 %      Basophils Relative 1 %      Neutrophils Absolute 6 28 Thousands/µL      Immature Grans Absolute 0 07 Thousand/uL      Lymphocytes Absolute 2 28 Thousands/µL      Monocytes Absolute 0 95 Thousand/µL      Eosinophils Absolute 0 74 Thousand/µL      Basophils Absolute 0 11 Thousands/µL                  XR chest 1 view portable    (Results Pending)              Procedures  Procedures         ED Course  ED Course as of 03/17/23 2354   Fri Mar 17, 2023   250 59-year-old female presenting to the emergency department with wheezing/COPD exacerbation  Vital signs on arrival notable for tachypnea, tachycardia  Exam remarkable for a was not woman with mild respiratory distress, audible wheezing both on and off auscultation, expiratory  Short sentences, patient is sitting upright but able to lie back  Heart notable for tachycardia  No other specific findings on physical exam     Patient is also complaining of new onset chest pain, different from previous exacerbations  Differential diagnosis includes COPD exacerbation, pneumonia, bronchitis, ACS, pneumothorax  Lab work and imaging was ordered  DuoNeb and Solu-Medrol was ordered for patient  1822 ECG 12 lead  Sinus tachycardia 109, QRS 88, QTc 471, no ST elevation or depression, no ectopy, no terminal R  Overall impression: No STEMI    1823 XR chest 1 view portable  My ED read: No osseous abnormality, no consolidation, no pneumothorax, no rib fractures  Overall impression: Within normal limits, no acute findings     1823 WBC(!): 10 43   1823 hs TnI 0hr: 4  Second troponin pending  1823 Aside from very slight leukocytosis, CBC and CMP are grossly unremarkable  1851 D-Dimer, Quant(!): 0 73   1852 D-Dimer, Quant(!): 0 73  Wells criteria is low at this time  Patient does not demonstrate objective evidence of PE/DVT  ET PE study was not ordered, however will reconsider as needed  1853 Reassessment, patient remains with wheezing  MOORE nebulization was started  After Yetta Look nebulization was complete, patient continued to have wheezing and SPO2 less than 90% on room air  Case was discussed with general medicine for admission                    PERC Rule for PE    Flowsheet Row Most Recent Value   PERC Rule for PE    Age >=50 1 Filed at: 03/17/2023 1852   HR >=100 1 Filed at: 03/17/2023 1852   O2 Sat on room air < 95% 1 Filed at: 03/17/2023 5998   History of PE or DVT 0 Filed at: 03/17/2023 0710   Recent trauma or surgery 0 Filed at: 03/17/2023 1852   Hemoptysis 0 Filed at: 03/17/2023 1852   Exogenous estrogen 0 Filed at: 03/17/2023 1852   Unilateral leg swelling 0 Filed at: 03/17/2023 1852   PERC Rule for PE Results 3 Filed at: 03/17/2023 8372                  Yaa Johnson' Criteria for PE    Flowsheet Row Most Recent Value   Wells' Criteria for PE    Clinical signs and symptoms of DVT 0 Filed at: 03/17/2023 7588   PE is primary diagnosis or equally likely 0 Filed at: 03/17/2023 1852   HR >100 1 5 Filed at: 03/17/2023 1852   Immobilization at least 3 days or Surgery in the previous 4 weeks 0 Filed at: 03/17/2023 5700   Previous, objectively diagnosed PE or DVT 0 Filed at: 03/17/2023 1852   Hemoptysis 0 Filed at: 03/17/2023 9750   Malignancy with treatment within 6 months or palliative 0 Filed at: 03/17/2023 7306   Wells' Criteria Total 1 5 Filed at: 03/17/2023 3794                MDM    Disposition  Final diagnoses:   COPD exacerbation ([facility] [address] )     Time reflects when diagnosis was documented in both MDM as applicable and the Disposition within this note     Time User Action Codes Description Comment    3/17/2023  8:09 PM Costella Meckel Add [J44 1] COPD exacerbation [facility])       ED Disposition     ED Disposition   Admit    Condition   Stable    Date/Time   Fri Mar 17, 2023 2009    Comment   Case was discussed with [facility] and the patient's admission status was agreed to be Admission Status: observation status to the service of Dr Skip Manule   Follow-up Information    None         Current Discharge Medication List      CONTINUE these medications which have NOT CHANGED    Details   albuterol (PROVENTIL HFA,VENTOLIN HFA) 90 mcg/act inhaler Inhale 2 puffs every 6 (six) hours as needed for wheezing      amLODIPine-valsartan (EXFORGE) 5-320 MG per tablet Take 1 tablet by mouth daily      fluticasone-umeclidinium-vilanterol (Trelegy Ellipta) 100-62 5-25 mcg/actuation inhaler Inhale 1 puff daily Rinse mouth after use  Multiple Vitamins-Minerals (WOMENS MULTIVITAMIN PO) Take by mouth      rizatriptan (MAXALT) 10 mg tablet Take 10 mg by mouth as needed for migraine Take at the onset of migraine; if symptoms continue or return, may take another dose at least 2 hours after first dose  Take no more than 2 doses in a day  Calcium Citrate-Vitamin D (CALCIUM + D PO) Take by mouth             No discharge procedures on file      PDMP Review     None          ED Provider  Electronically Signed by           Marilee Hastings MD  03/17/23 5609

## 2023-03-19 RX ORDER — METHYLPREDNISOLONE SODIUM SUCCINATE 40 MG/ML
40 INJECTION, POWDER, LYOPHILIZED, FOR SOLUTION INTRAMUSCULAR; INTRAVENOUS EVERY 12 HOURS SCHEDULED
Status: DISCONTINUED | OUTPATIENT
Start: 2023-03-19 | End: 2023-03-20 | Stop reason: HOSPADM

## 2023-03-19 RX ORDER — GUAIFENESIN 600 MG/1
1200 TABLET, EXTENDED RELEASE ORAL EVERY 12 HOURS SCHEDULED
Status: DISCONTINUED | OUTPATIENT
Start: 2023-03-19 | End: 2023-03-20 | Stop reason: HOSPADM

## 2023-03-19 RX ADMIN — BENZONATATE 100 MG: 100 CAPSULE ORAL at 20:50

## 2023-03-19 RX ADMIN — GUAIFENESIN 1200 MG: 600 TABLET ORAL at 08:56

## 2023-03-19 RX ADMIN — METHYLPREDNISOLONE SODIUM SUCCINATE 60 MG: 125 INJECTION, POWDER, FOR SOLUTION INTRAMUSCULAR; INTRAVENOUS at 05:16

## 2023-03-19 RX ADMIN — LOSARTAN POTASSIUM 100 MG: 50 TABLET, FILM COATED ORAL at 08:56

## 2023-03-19 RX ADMIN — ENOXAPARIN SODIUM 40 MG: 40 INJECTION SUBCUTANEOUS at 08:56

## 2023-03-19 RX ADMIN — ENOXAPARIN SODIUM 40 MG: 40 INJECTION SUBCUTANEOUS at 17:11

## 2023-03-19 RX ADMIN — HYDROCODONE POLISTIREX AND CHLORPHENIRAMINE POLISTIREX 5 ML: 10; 8 SUSPENSION, EXTENDED RELEASE ORAL at 09:02

## 2023-03-19 RX ADMIN — AMLODIPINE BESYLATE 5 MG: 5 TABLET ORAL at 08:56

## 2023-03-19 RX ADMIN — BENZONATATE 100 MG: 100 CAPSULE ORAL at 15:42

## 2023-03-19 RX ADMIN — METHYLPREDNISOLONE SODIUM SUCCINATE 40 MG: 40 INJECTION, POWDER, FOR SOLUTION INTRAMUSCULAR; INTRAVENOUS at 21:04

## 2023-03-19 RX ADMIN — IPRATROPIUM BROMIDE 0.5 MG: 0.5 SOLUTION RESPIRATORY (INHALATION) at 08:04

## 2023-03-19 RX ADMIN — MELATONIN 6 MG: at 21:01

## 2023-03-19 RX ADMIN — LEVALBUTEROL HYDROCHLORIDE 1.25 MG: 1.25 SOLUTION RESPIRATORY (INHALATION) at 08:04

## 2023-03-19 RX ADMIN — MULTIPLE VITAMINS W/ MINERALS TAB 1 TABLET: TAB ORAL at 08:56

## 2023-03-19 RX ADMIN — IPRATROPIUM BROMIDE 0.5 MG: 0.5 SOLUTION RESPIRATORY (INHALATION) at 20:14

## 2023-03-19 RX ADMIN — Medication 14 MG: at 08:56

## 2023-03-19 RX ADMIN — BENZONATATE 100 MG: 100 CAPSULE ORAL at 08:56

## 2023-03-19 RX ADMIN — BUDESONIDE 0.5 MG: 0.5 INHALANT ORAL at 08:04

## 2023-03-19 RX ADMIN — HYDROCODONE POLISTIREX AND CHLORPHENIRAMINE POLISTIREX 5 ML: 10; 8 SUSPENSION, EXTENDED RELEASE ORAL at 21:26

## 2023-03-19 RX ADMIN — LEVALBUTEROL HYDROCHLORIDE 1.25 MG: 1.25 SOLUTION RESPIRATORY (INHALATION) at 13:52

## 2023-03-19 RX ADMIN — AZITHROMYCIN MONOHYDRATE 500 MG: 250 TABLET ORAL at 22:50

## 2023-03-19 RX ADMIN — GUAIFENESIN 1200 MG: 600 TABLET ORAL at 20:50

## 2023-03-19 RX ADMIN — LEVALBUTEROL HYDROCHLORIDE 1.25 MG: 1.25 SOLUTION RESPIRATORY (INHALATION) at 20:14

## 2023-03-19 RX ADMIN — IPRATROPIUM BROMIDE 0.5 MG: 0.5 SOLUTION RESPIRATORY (INHALATION) at 13:52

## 2023-03-19 RX ADMIN — BUDESONIDE 0.5 MG: 0.5 INHALANT ORAL at 20:14

## 2023-03-19 NOTE — UTILIZATION REVIEW
Initial Clinical Review    Admission: Date/Time/Statement:   Admission Orders (From admission, onward)     Ordered        03/19/23 1532  Inpatient Admission  Once            03/17/23 2010  Place in Observation  Once                      Orders Placed This Encounter   • Inpatient Admission     Standing Status:   Standing     Number of Occurrences:   1     Order Specific Question:   Level of Care     Answer:   Med Surg [16]     Order Specific Question:   Estimated length of stay     Answer:   More than 2 Midnights     Order Specific Question:   Certification     Answer:   I certify that inpatient services are medically necessary for this patient for a duration of greater than two midnights  See H&P and MD Progress Notes for additional information about the patient's course of treatment  ED Arrival Information     Expected   -    Arrival   3/17/2023 15:48    Acuity   Urgent            Means of arrival   Walk-In    Escorted by   Spouse    Service   Hospitalist    Admission type   Emergency            Arrival complaint   SOB           Chief Complaint   Patient presents with   • Shortness of Breath     PT presents with SOB that started Tuesday with a non productive cough  PT can be heard wheezing in triage       Initial Presentation: 66 y o  female  with a PMH of HTN, HLD, obesity, nicotine abuse, marijuana use, who presents with shortness of breath, wheezing, coughing that began Tuesday  Resumed use of trelegy and nebulizers when she began feeling unwell on Tuesday but had no relief  Smokes 1 pack every 2-3 days  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, naphtha, alcohol  Has two dogs at home  She last required course of prednisone 2/8-2/16 for COPD exacerbation  CXR neg  Breath sounds expiratory wheezes throughout lung fields     3/17     ADMIT  OBS  Status,  MS Level of care for treatment of  Acute exacerbation of COPD    Initiate IV solumedrol 60 mg q8H;  duoneb tid;  bydesonide bid; Azithromycin po ;  Given IV Mg x1    Date:    3/18    Day 2:  reports continued shortness of breath without relief overnight,  continuous cough with taking deep breaths  Cont IV solumedrol, increase frequency of cough suppressant,  Viral swab neg  Remains off supplemental oxygen  3/19   CHANGED To INPATIENT  STATUS ,  MS  Level of care  For ongoing IV steroid/neb therapies  Decreased IV solumedrol to 40 mg q12H       3/19  Pt expressed disappointment if no feeling remarkably better  Cont to cough w/pain around sternum and associated SOB    Still "Feels wheezy"  (breath sounds improved - still  scattered wheezes throughout)        ED Triage Vitals   Temperature Pulse Respirations Blood Pressure SpO2   03/17/23 1604 03/17/23 1604 03/17/23 1604 03/17/23 1604 03/17/23 1604   98 4 °F (36 9 °C) 104 20 133/76 94 %      Temp Source Heart Rate Source Patient Position - Orthostatic VS BP Location FiO2 (%)   03/17/23 1604 03/17/23 1604 03/17/23 1604 03/17/23 1604 --   Oral Monitor Sitting Right arm       Pain Score       03/17/23 2153       No Pain          Wt Readings from Last 1 Encounters:   03/17/23 107 kg (235 lb 7 2 oz)     Additional Vital Signs:   03/19/23 15:35:40 97 7 °F (36 5 °C) 98 17 108/73 85 93 % --   03/19/23 1352 -- -- -- -- -- 93 % None (Room air)   03/19/23 0830 -- 106 Abnormal  -- -- -- 95 % --   03/19/23 0805 -- -- -- -- -- 92 % None (Room air)   03/19/23 0800 -- 93 -- -- -- 94 % --   03/19/23 07:21:26 97 7 °F (36 5 °C) 98 16 116/74 88 90 % --   03/18/23 21:54:22 97 7 °F (36 5 °C) 113 Abnormal  18 126/77 93 93 % None (Room air)   03/18/23 1427 -- -- -- -- -- 95 % None (Room air)   03/18/23 13:59:22 98 °F (36 7 °C) 116 Abnormal  17 108/70 83 93 % --   03/18/23 08:08:35 97 5 °F (36 4 °C) 110 Abnormal  16 109/79 89 96 % --   03/18/23 0750 -- -- -- -- -- 93 % None (Room air)   03/17/23 1901 -- -- -- -- -- -- None (Room air)   03/17/23 1900 -- 95 16 141/83 107 96 % --   03/17/23 1800 -- 102 16 142/77 104 98 % --   03/17/23 1744 -- 96 16 159/68 -- 98 % None (Room air)     Pertinent Labs/Diagnostic Test Results:   3/17  ekg sinus tach     XR chest 1 view portable   Final Result by Stefanie Garrison MD (03/18 1249)      No acute cardiopulmonary disease           Results from last 7 days   Lab Units 03/17/23  1736   SARS-COV-2  Negative     Results from last 7 days   Lab Units 03/18/23  0424 03/17/23  1615   WBC Thousand/uL 11 30* 10 43*   HEMOGLOBIN g/dL 14 2 15 0   HEMATOCRIT % 42 7 45 3   PLATELETS Thousands/uL 319 314   NEUTROS ABS Thousands/µL 10 05* 6 28         Results from last 7 days   Lab Units 03/18/23  0424 03/17/23  1615   SODIUM mmol/L 136 141   POTASSIUM mmol/L 4 3 4 2   CHLORIDE mmol/L 104 104   CO2 mmol/L 23 29   ANION GAP mmol/L 9 8   BUN mg/dL 15 14   CREATININE mg/dL 0 75 0 80   EGFR ml/min/1 73sq m 89 82   CALCIUM mg/dL 9 4 9 9     Results from last 7 days   Lab Units 03/17/23  1615   AST U/L 23   ALT U/L 31   ALK PHOS U/L 73   TOTAL PROTEIN g/dL 7 3   ALBUMIN g/dL 4 3   TOTAL BILIRUBIN mg/dL 0 68         Results from last 7 days   Lab Units 03/18/23  0424 03/17/23  1615   GLUCOSE RANDOM mg/dL 220* 88       Results from last 7 days   Lab Units 03/17/23  1859 03/17/23  1615   HS TNI 0HR ng/L  --  4   HS TNI 2HR ng/L 3  --    HSTNI D2 ng/L -1  --      Results from last 7 days   Lab Units 03/17/23  1736   D-DIMER QUANTITATIVE ug/ml FEU 0 73*             Results from last 7 days   Lab Units 03/18/23  0424 03/17/23  1615   PROCALCITONIN ng/ml <0 05 <0 05       Results from last 7 days   Lab Units 03/17/23  1736   INFLUENZA A PCR  Negative   INFLUENZA B PCR  Negative   RSV PCR  Negative     ED Treatment:   Medication Administration from 03/17/2023 1548 to 03/17/2023 2146       Date/Time Order Dose Route Action     03/17/2023 1740 EDT methylPREDNISolone sodium succinate (Solu-MEDROL) injection 125 mg 125 mg Intravenous Given     03/17/2023 7240 EDT ipratropium-albuterol (DUO-NEB) 0 5-2 5 mg/3 mL inhalation solution 3 mL 3 mL Nebulization Given     03/17/2023 1901 EDT albuterol inhalation solution 10 mg 10 mg Nebulization Given     03/17/2023 1901 EDT ipratropium (ATROVENT) 0 02 % inhalation solution 0 5 mg 0 5 mg Nebulization Given     03/17/2023 1901 EDT sodium chloride 0 9 % inhalation solution 3 mL 3 mL Nebulization Given        Past Medical History:   Diagnosis Date   • Hypertension      Admitting Diagnosis: SOB (shortness of breath) [R06 02]  COPD exacerbation (HCC) [J44 1]  Age/Sex: 66 y o  female  Admission Orders:  See above note  Scheduled Medications:  losartan, 100 mg, Oral, Daily  amLODIPine, 5 mg, Oral, Daily  azithromycin, 500 mg, Oral, Q24H  benzonatate, 100 mg, Oral, TID  budesonide, 0 5 mg, Nebulization, Q12H  enoxaparin, 40 mg, Subcutaneous, BID  guaiFENesin, 1,200 mg, Oral, Q12H ELIANA  ipratropium, 0 5 mg, Nebulization, TID  levalbuterol, 1 25 mg, Nebulization, TID  methylPREDNISolone sodium succinate, 40 mg, Intravenous, Q12H [address]  multivitamin-minerals, 1 tablet, Oral, Daily  nicotine, 14 mg, Transdermal, Daily      Continuous IV Infusions:     PRN Meds:  acetaminophen, 650 mg, Oral, Q6H PRN  aluminum-magnesium hydroxide-simethicone, 30 mL, Oral, Q6H PRN  Hydrocod Hieu-Chlorphe Hieu ER, 5 mL, Oral, Q8H PRN  melatonin, 6 mg, Oral, HS PRN      Network Utilization Review Department  ATTENTION: Please call with any questions or concerns to [phone] and carefully listen to the prompts so that you are directed to the right person  All voicemails are confidential   [place] all requests for admission clinical reviews, approved or denied determinations and any other requests to dedicated fax number below belonging to the campus where the patient is receiving treatment   List of dedicated fax numbers for the Facilities:  FACILITY NAME UR FAX NUMBER   ADMISSION DENIALS (Administrative/Medical Necessity) [address] [facility] (Maternity/NICU/Pediatrics) [phone]   [facility] [address] [facility] [phone]   [facility] [address] [phone]   [address] [place] [id number] [place] [address] [facility]  [phone] [address] [place] [facility] [address] [facility] [phone]

## 2023-03-19 NOTE — PROGRESS NOTES
[facility]  Progress Note - Howie Mantilla 1965, 66 y o  female MRN: [id number]  Unit/Bed#: S -01 Encounter: [id number]  Primary Care Provider: No primary care provider on file  Date and time admitted to hospital: 3/17/2023  5:18 PM    * Acute exacerbation of chronic obstructive pulmonary disease (COPD) ([facility] [address] )  Assessment & Plan  Admitted with sudden onset of shortness of breath on 317  She had no relief with Trelegy or albuterol nebs at home  Her chest x-ray was unremarkable on admission and she was started on IV Solu-Medrol  She still feels that her coughing is a large discomfort  · Continue IV Solu-Medrol, taper to every 12 today  · Increased frequency of cough suppressant as this is her biggest challenge currently, will increase Mucinex to twice daily  · She remains off of supplemental oxygen  · Viral swab negative    Hyperlipidemia  Assessment & Plan  Does not tolerate statins per outpatient notes  Nicotine abuse  Assessment & Plan  Continue replacement therapy    Hypertension  Assessment & Plan  Blood pressure stable on amlodipine and valsartan          VTE Pharmacologic Prophylaxis: VTE Score: 4 Moderate Risk (Score 3-4) - Pharmacological DVT Prophylaxis Ordered: enoxaparin (Lovenox)  Patient Centered Rounds: I performed bedside rounds with nursing staff today  Discussions with Specialists or Other Care Team Provider: None    Education and Discussions with Family / Patient: Patient declined call to   Total Time Spent on Date of Encounter in care of patient: 35 minutes This time was spent on one or more of the following: performing physical exam; counseling and coordination of care; obtaining or reviewing history; documenting in the medical record; reviewing/ordering tests, medications or procedures; communicating with other healthcare professionals and discussing with patient's family/caregivers      Current Length of Stay: 0 day(s)  Current Patient Status: Observation   Certification Statement: The patient will continue to require additional inpatient hospital stay due to Acute COPD exacerbation  Discharge Plan: Anticipate discharge later today or tomorrow to home  Code Status: Level 1 - Full Code    Subjective:   She is still discouraged that she is not feeling remarkably better  She notes her cough is thick and causing increased pain around her sternum  She denies fevers or chills  She has shortness of breath when her coughing attacks start  She feels the cough syrup does help  She still feels she is wheezing    Objective:     Vitals:   Temp (24hrs), Av 8 °F (36 6 °C), Min:97 7 °F (36 5 °C), Max:98 °F (36 7 °C)    Temp:  [97 7 °F (36 5 °C)-98 °F (36 7 °C)] 97 7 °F (36 5 °C)  HR:  [] 106  Resp:  [16-18] 16  BP: (108-126)/(70-77) 116/74  SpO2:  [90 %-95 %] 95 %  Body mass index is 41 71 kg/m²  Input and Output Summary (last 24 hours):   No intake or output data in the 24 hours ending 23 0942    Physical Exam:   Physical Exam  Vitals and nursing note reviewed  Constitutional:       Appearance: Normal appearance  She is not ill-appearing or diaphoretic  HENT:      Nose: Nose normal  No congestion  Mouth/Throat:      Mouth: Mucous membranes are moist       Pharynx: No oropharyngeal exudate  Eyes:      General: No scleral icterus  Conjunctiva/sclera: Conjunctivae normal    Pulmonary:      Effort: Pulmonary effort is normal       Breath sounds: Wheezing present  Abdominal:      General: Bowel sounds are normal  There is no distension  Palpations: Abdomen is soft  Tenderness: There is no abdominal tenderness  Genitourinary:     Comments: No Patrick  Musculoskeletal:      Cervical back: Normal range of motion and neck supple  No rigidity  Skin:     General: Skin is warm  Coloration: Skin is not jaundiced  Neurological:      Mental Status: She is alert and oriented to person, place, and time  Psychiatric:         Behavior: Behavior normal           Additional Data:     Labs:  Results from last 7 days   Lab Units 03/18/23  0424   WBC Thousand/uL 11 30*   HEMOGLOBIN g/dL 14 2   HEMATOCRIT % 42 7   PLATELETS Thousands/uL 319   NEUTROS PCT % 89*   LYMPHS PCT % 8*   MONOS PCT % 2*   EOS PCT % 0     Results from last 7 days   Lab Units 03/18/23  0424 03/17/23  1615   SODIUM mmol/L 136 141   POTASSIUM mmol/L 4 3 4 2   CHLORIDE mmol/L 104 104   CO2 mmol/L 23 29   BUN mg/dL 15 14   CREATININE mg/dL 0 75 0 80   ANION GAP mmol/L 9 8   CALCIUM mg/dL 9 4 9 9   ALBUMIN g/dL  --  4 3   TOTAL BILIRUBIN mg/dL  --  0 68   ALK PHOS U/L  --  73   ALT U/L  --  31   AST U/L  --  23   GLUCOSE RANDOM mg/dL 220* 88                 Results from last 7 days   Lab Units 03/18/23  0424 03/17/23  1615   PROCALCITONIN ng/ml <0 05 <0 05       Lines/Drains:  Invasive Devices     Peripheral Intravenous Line  Duration           Peripheral IV 03/17/23 Right Antecubital 1 day                      Imaging: No pertinent imaging reviewed      Recent Cultures (last 7 days):         Last 24 Hours Medication List:   Current Facility-Administered Medications   Medication Dose Route Frequency Provider Last Rate   • acetaminophen  650 mg Oral Q6H PRN DESMOND Keith     • aluminum-magnesium hydroxide-simethicone  30 mL Oral Q6H PRN DESMOND Keith     • losartan  100 mg Oral Daily DESMOND Keith      And   • amLODIPine  5 mg Oral Daily DESMOND Keith     • azithromycin  500 mg Oral Q24H DESMOND Keith     • benzonatate  100 mg Oral TID DESMOND Keith     • budesonide  0 5 mg Nebulization Q12H DESMOND Keith     • enoxaparin  40 mg Subcutaneous BID DESMOND Keith     • guaiFENesin  1,200 mg Oral Q12H [address] Guerita Ferrer MD     • Hydrocod Hieu-Chlorphe Hieu ER  5 mL Oral Q8H PRN Guerita Ferrer MD     • ipratropium  0 5 mg Nebulization TID DESMOND Keith     • levalbuterol  1 25 mg Nebulization TID DESMOND Keith     • melatonin  6 mg Oral HS PRN Anastasia Lion MD     • methylPREDNISolone sodium succinate  40 mg Intravenous Q12H [address] Anastasia Lion MD     • multivitamin-minerals  1 tablet Oral Daily DESMOND Maharaj     • nicotine  14 mg Transdermal Daily DESMOND Maharaj          Today, Patient Was Seen By: Anastasia Lion MD    **Please Note: This note may have been constructed using a voice recognition system  **

## 2023-03-19 NOTE — ASSESSMENT & PLAN NOTE
Admitted with sudden onset of shortness of breath on 317  She had no relief with Trelegy or albuterol nebs at home  Her chest x-ray was unremarkable on admission and she was started on IV Solu-Medrol    She still feels that her coughing is a large discomfort  · Continue IV Solu-Medrol, taper to every 12 today  · Increased frequency of cough suppressant as this is her biggest challenge currently, will increase Mucinex to twice daily  · She remains off of supplemental oxygen  · Viral swab negative

## 2023-03-20 VITALS
TEMPERATURE: 97.9 F | RESPIRATION RATE: 16 BRPM | WEIGHT: 235.45 LBS | DIASTOLIC BLOOD PRESSURE: 81 MMHG | OXYGEN SATURATION: 93 % | SYSTOLIC BLOOD PRESSURE: 131 MMHG | HEART RATE: 95 BPM | BODY MASS INDEX: 41.72 KG/M2 | HEIGHT: 63 IN

## 2023-03-20 RX ORDER — NICOTINE 21 MG/24HR
1 PATCH, TRANSDERMAL 24 HOURS TRANSDERMAL DAILY
Qty: 28 PATCH | Refills: 0 | Status: SHIPPED | OUTPATIENT
Start: 2023-03-21

## 2023-03-20 RX ORDER — PREDNISONE 10 MG/1
TABLET ORAL
Qty: 30 TABLET | Refills: 0 | Status: SHIPPED | OUTPATIENT
Start: 2023-03-21 | End: 2023-04-02

## 2023-03-20 RX ADMIN — LOSARTAN POTASSIUM 100 MG: 50 TABLET, FILM COATED ORAL at 08:45

## 2023-03-20 RX ADMIN — GUAIFENESIN 1200 MG: 600 TABLET ORAL at 08:45

## 2023-03-20 RX ADMIN — Medication 14 MG: at 08:46

## 2023-03-20 RX ADMIN — METHYLPREDNISOLONE SODIUM SUCCINATE 40 MG: 40 INJECTION, POWDER, FOR SOLUTION INTRAMUSCULAR; INTRAVENOUS at 08:52

## 2023-03-20 RX ADMIN — AMLODIPINE BESYLATE 5 MG: 5 TABLET ORAL at 08:45

## 2023-03-20 RX ADMIN — BENZONATATE 100 MG: 100 CAPSULE ORAL at 08:45

## 2023-03-20 RX ADMIN — IPRATROPIUM BROMIDE 0.5 MG: 0.5 SOLUTION RESPIRATORY (INHALATION) at 08:21

## 2023-03-20 RX ADMIN — HYDROCODONE POLISTIREX AND CHLORPHENIRAMINE POLISTIREX 5 ML: 10; 8 SUSPENSION, EXTENDED RELEASE ORAL at 09:00

## 2023-03-20 RX ADMIN — BUDESONIDE 0.5 MG: 0.5 INHALANT ORAL at 08:21

## 2023-03-20 RX ADMIN — MULTIPLE VITAMINS W/ MINERALS TAB 1 TABLET: TAB ORAL at 08:44

## 2023-03-20 RX ADMIN — LEVALBUTEROL HYDROCHLORIDE 1.25 MG: 1.25 SOLUTION RESPIRATORY (INHALATION) at 08:21

## 2023-03-20 RX ADMIN — ENOXAPARIN SODIUM 40 MG: 40 INJECTION SUBCUTANEOUS at 08:45

## 2023-03-20 NOTE — ASSESSMENT & PLAN NOTE
Admitted with sudden onset of shortness of breath on 317  She had no relief with Trelegy or albuterol nebs at home  Her chest x-ray was unremarkable on admission and she was started on IV Solu-Medrol    She still feels that her coughing is a large discomfort  · We will transition IV steroids to p o  prednisone  · Increased frequency of cough suppressant as this is her biggest challenge currently, will increase Mucinex to twice daily  · She remains off of supplemental oxygen  · Viral swab negative

## 2023-03-20 NOTE — DISCHARGE INSTR - AVS FIRST PAGE
Please be aware the medication adjustment/addition: The medication, prednisone, will be added to your current medication regimen  Starting tomorrow please take 4 tablets for the first 3 days, followed by 3 tablets for the following 3 days, followed by 2 tablets for the next 3 days, and finally 1 tablet for the final 3 days  A nicotine patch was sent to your pharmacy  Please see your pulmonologist or PCP for continued care after hospitalization  Please touch base with them within 1 week

## 2023-03-20 NOTE — DISCHARGE SUMMARY
[facility]  Discharge- Jonah Webster 1965, 66 y o  female MRN: [id number]  Unit/Bed#: S -01 Encounter: [id number]  Primary Care Provider: No primary care provider on file  Date and time admitted to hospital: 3/17/2023  5:18 PM    Hyperlipidemia  Assessment & Plan  Does not tolerate statins per outpatient notes  Nicotine abuse  Assessment & Plan  Continue replacement therapy    Hypertension  Assessment & Plan  Blood pressure stable on amlodipine and valsartan    * Acute exacerbation of chronic obstructive pulmonary disease (COPD) ([facility] [address] )  Assessment & Plan  Admitted with sudden onset of shortness of breath on 317  She had no relief with Trelegy or albuterol nebs at home  Her chest x-ray was unremarkable on admission and she was started on IV Solu-Medrol  She still feels that her coughing is a large discomfort  · We will transition IV steroids to p o  prednisone  · Increased frequency of cough suppressant as this is her biggest challenge currently, will increase Mucinex to twice daily  · She remains off of supplemental oxygen  · Viral swab negative      Medical Problems     Resolved Problems  Date Reviewed: 3/17/2023   None       Discharging Physician / Practitioner: Erik Patrick PA-C  PCP: No primary care provider on file  Admission Date:   Admission Orders (From admission, onward)     Ordered        03/19/23 1532  Inpatient Admission  Once            03/17/23 2010  Place in Observation  Once                      Discharge Date: 03/20/23    Consultations During Hospital Stay:  · None    Procedures Performed:   XR chest 1 view portable  Impression: No acute cardiopulmonary disease      Significant Findings / Test Results:   · Flu/RSV/COVID negative  · Procalcitonin less than 0 05  · WBC 11 3    Incidental Findings:   · None      Test Results Pending at Discharge (will require follow up):    · None     Outpatient Tests Requested:  · None    Complications: None    Reason for Admission: COPD exacerbation    Hospital Course:   Hammad Cary is a 66 y o  female patient who originally presented to the hospital on 3/17/2023 due to worsening shortness of breath, wheezing, and cough  When she was in the ED a chest x-ray was obtained and did not show any acute cardiopulmonary findings  The patient was started on IV Solu-Medrol and thankfully did not require supplemental oxygen  The patient reports significant improvement with IV Solu-Medrol and she was tapered off until she was able to be on p o  steroids  She continued to respond well and at this time she is medically stable for discharge and agreeable for plan of discharge  For further remission regards to course of hospitalization, please see notes attached  Please see above list of diagnoses and related plan for additional information  Condition at Discharge: good    Discharge Day Visit / Exam:   Subjective: Patient reports feeling significantly improved in terms of shortness of breath would like to be discharged home today  She denies chest pain/pressure, nausea, lightheadedness, shortness of breath, or palpitations  Vitals: Blood Pressure: 131/81 (03/20/23 0844)  Pulse: 95 (03/20/23 0844)  Temperature: 97 9 °F (36 6 °C) (03/20/23 0844)  Temp Source: Oral (03/19/23 1535)  Respirations: 16 (03/20/23 0844)  Height: 5' 3" (160 cm) (03/17/23 1605)  Weight - Scale: 107 kg (235 lb 7 2 oz) (03/17/23 1605)  SpO2: 93 % (03/20/23 0844)  Exam:   Physical Exam  Vitals and nursing note reviewed  Constitutional:       Appearance: She is normal weight  HENT:      Head: Normocephalic  Nose: Nose normal       Mouth/Throat:      Mouth: Mucous membranes are moist       Pharynx: Oropharynx is clear  Eyes:      General: No scleral icterus  Conjunctiva/sclera: Conjunctivae normal       Pupils: Pupils are equal, round, and reactive to light  Cardiovascular:      Rate and Rhythm: Normal rate and regular rhythm  Heart sounds: No murmur heard  No friction rub  No gallop  Pulmonary:      Effort: Pulmonary effort is normal  No respiratory distress  Breath sounds: No stridor  Wheezing ( Expiratory bilaterally generalized) present  No rhonchi or rales  Abdominal:      General: Abdomen is flat  Palpations: Abdomen is soft  Musculoskeletal:         General: Normal range of motion  Cervical back: Normal range of motion and neck supple  Right lower leg: No edema  Left lower leg: No edema  Lymphadenopathy:      Cervical: No cervical adenopathy  Skin:     General: Skin is warm  Coloration: Skin is not jaundiced or pale  Findings: No bruising, erythema or lesion  Neurological:      General: No focal deficit present  Mental Status: She is alert and oriented to person, place, and time  Mental status is at baseline  Cranial Nerves: No cranial nerve deficit  Motor: No weakness  Psychiatric:         Mood and Affect: Mood normal          Behavior: Behavior normal          Thought Content: Thought content normal           Discussion with Family: Updated  () via phone  Discharge instructions/Information to patient and family:   See after visit summary for information provided to patient and family  Provisions for Follow-Up Care:  See after visit summary for information related to follow-up care and any pertinent home health orders  Disposition:   Home    Planned Readmission: No     Discharge Statement:  I spent 60 minutes discharging the patient  This time was spent on the day of discharge  I had direct contact with the patient on the day of discharge  Greater than 50% of the total time was spent examining patient, answering all patient questions, arranging and discussing plan of care with patient as well as directly providing post-discharge instructions  Additional time then spent on discharge activities      Discharge Medications:  See after visit summary for reconciled discharge medications provided to patient and/or family        **Please Note: This note may have been constructed using a voice recognition system**

## 2023-03-20 NOTE — MALNUTRITION/BMI
This medical record reflects one or more clinical indicators suggestive of malnutrition and/or morbid obesity  BMI Findings:  Adult BMI Classifications: Morbid Obesity 40-44 9        Body mass index is 41 71 kg/m²  See Nutrition note dated 3/20/23  for additional details  Completed nutrition assessment is viewable in the nutrition documentation

## 2023-03-21 NOTE — UTILIZATION REVIEW
NOTIFICATION OF ADMISSION DISCHARGE   This is a Notification of Discharge from [facility]  Please be advised that this patient has been discharge from our facility  Below you will find the admission and discharge date and time including the patient’s disposition  UTILIZATION REVIEW CONTACT:  Adal Grimm MA  Utilization   Network Utilization Review Department  Phone: [phone] x carefully listen to the prompts  All voicemails are confidential   Email: [email or web address]     ADMISSION INFORMATION  PRESENTATION DATE: 3/17/2023  5:18 PM  OBERVATION ADMISSION DATE:   INPATIENT ADMISSION DATE: 3/19/23  3:32 PM   DISCHARGE DATE: 3/20/2023 11:43 AM   DISPOSITION:Home/Self Care    IMPORTANT INFORMATION:  Send all requests for admission clinical reviews, approved or denied determinations and any other requests to dedicated fax number below belonging to the campus where the patient is receiving treatment   List of dedicated fax numbers:  [address] DENIALS (Administrative/Medical Necessity) [phone]   [address] (Maternity/NICU/Pediatrics) [phone]   [facility] [phone]   [facility] 87 [phone]   [address] [phone]   220 [facility] [phone] 90 [facility] [phone]   [address] [place] [address] 119 [phone]   [facility]  [phone]   4056 [facility] [phone]   412 [facility] 850 [address] [facility] [phone]

## 2023-05-02 ENCOUNTER — CONSULT (OUTPATIENT)
Dept: PULMONOLOGY | Facility: CLINIC | Age: 58
End: 2023-05-02

## 2023-05-02 ENCOUNTER — TELEPHONE (OUTPATIENT)
Dept: PULMONOLOGY | Facility: CLINIC | Age: 58
End: 2023-05-02

## 2023-05-02 VITALS
HEIGHT: 63 IN | OXYGEN SATURATION: 96 % | TEMPERATURE: 99.2 F | WEIGHT: 240.6 LBS | HEART RATE: 103 BPM | RESPIRATION RATE: 18 BRPM | BODY MASS INDEX: 42.63 KG/M2 | SYSTOLIC BLOOD PRESSURE: 118 MMHG | DIASTOLIC BLOOD PRESSURE: 84 MMHG

## 2023-05-02 DIAGNOSIS — Z12.2 ENCOUNTER FOR SCREENING FOR MALIGNANT NEOPLASM OF LUNG IN PATIENT WITH LESS THAN 30 PACK YEAR SMOKING HISTORY: ICD-10-CM

## 2023-05-02 DIAGNOSIS — G47.33 OBSTRUCTIVE SLEEP APNEA SYNDROME: ICD-10-CM

## 2023-05-02 DIAGNOSIS — F17.200 NICOTINE DEPENDENCE WITH CURRENT USE: ICD-10-CM

## 2023-05-02 DIAGNOSIS — Z87.891 ENCOUNTER FOR SCREENING FOR MALIGNANT NEOPLASM OF LUNG IN PATIENT WITH LESS THAN 30 PACK YEAR SMOKING HISTORY: ICD-10-CM

## 2023-05-02 DIAGNOSIS — E66.01 CLASS 3 SEVERE OBESITY DUE TO EXCESS CALORIES WITHOUT SERIOUS COMORBIDITY WITH BODY MASS INDEX (BMI) OF 40.0 TO 44.9 IN ADULT (HCC): ICD-10-CM

## 2023-05-02 DIAGNOSIS — J44.1 ACUTE EXACERBATION OF CHRONIC OBSTRUCTIVE PULMONARY DISEASE (COPD) (HCC): Primary | ICD-10-CM

## 2023-05-02 PROBLEM — G47.30 SLEEP APNEA: Status: ACTIVE | Noted: 2023-05-02

## 2023-05-02 PROBLEM — E66.813 CLASS 3 SEVERE OBESITY DUE TO EXCESS CALORIES WITHOUT SERIOUS COMORBIDITY IN ADULT (HCC): Status: ACTIVE | Noted: 2023-05-02

## 2023-05-02 RX ORDER — PREDNISONE 20 MG/1
40 TABLET ORAL DAILY
COMMUNITY
Start: 2023-04-08 | End: 2023-05-02 | Stop reason: ALTCHOICE

## 2023-05-02 RX ORDER — FLUTICASONE PROPIONATE AND SALMETEROL 100; 50 UG/1; UG/1
POWDER RESPIRATORY (INHALATION)
COMMUNITY
Start: 2023-04-05 | End: 2023-05-02

## 2023-05-02 RX ORDER — TRIAMCINOLONE ACETONIDE 0.1 %
1 PASTE (GRAM) DENTAL 2 TIMES DAILY
COMMUNITY
Start: 2022-08-15 | End: 2023-08-15

## 2023-05-02 RX ORDER — FLUTICASONE FUROATE, UMECLIDINIUM BROMIDE AND VILANTEROL TRIFENATATE 100; 62.5; 25 UG/1; UG/1; UG/1
1 POWDER RESPIRATORY (INHALATION) DAILY
Qty: 60 BLISTER | Refills: 6 | Status: SHIPPED | OUTPATIENT
Start: 2023-05-02

## 2023-05-02 RX ORDER — FLUTICASONE FUROATE, UMECLIDINIUM BROMIDE AND VILANTEROL TRIFENATATE 100; 62.5; 25 UG/1; UG/1; UG/1
1 POWDER RESPIRATORY (INHALATION) DAILY
Qty: 60 BLISTER | Refills: 6 | Status: SHIPPED | OUTPATIENT
Start: 2023-05-02 | End: 2023-05-02 | Stop reason: SDUPTHER

## 2023-05-02 RX ORDER — PREDNISONE 20 MG/1
TABLET ORAL
COMMUNITY
Start: 2023-04-08 | End: 2023-05-02 | Stop reason: SDUPTHER

## 2023-05-02 NOTE — ASSESSMENT & PLAN NOTE
I counseled patient for 4 minutes to quit smoking  I believe at this level of cigarette smoking with 3 cigarettes/day she can quit on her own or use nicotine gum  If she is not successful then we can consider Chantix or Wellbutrin  I asked her to set a date and get rid of cigarettes from her car

## 2023-05-02 NOTE — ASSESSMENT & PLAN NOTE
Based on symptoms and findings I believe patient has obstructive sleep apnea and she needs sleep study specially with her long drive    I counseled her about this condition and she agreed with work-up

## 2023-05-02 NOTE — ASSESSMENT & PLAN NOTE
The most recent CT scan from March 2023 showed a stable lung nodule    We will continue lung cancer screening every year, neck CT scan should be for March 2024

## 2023-05-02 NOTE — ASSESSMENT & PLAN NOTE
Currently feels better, I believe patient has COPD based on symptoms or could be COPD-asthma overlap  Regardless I told her to continue Trelegy for now and give a sample as well and we will check PFTs then we can adjust her medications based on results  If it is not consistent with asthma component then I may switch to Anoro  Continue as needed albuterol    Encouraged to quit smoking

## 2023-05-02 NOTE — PROGRESS NOTES
Consultation - Pulmonary Medicine   Ginny Agarwal 66 y o  female MRN: [id number]    Physician Requesting Consult: Jaskaran Pierre MD   Reason for Consult: Shortness of breath, COPD    Acute exacerbation of chronic obstructive pulmonary disease (COPD) ([facility] )  Currently feels better, I believe patient has COPD based on symptoms or could be COPD-asthma overlap  Regardless I told her to continue Trelegy for now and give a sample as well and we will check PFTs then we can adjust her medications based on results  If it is not consistent with asthma component then I may switch to Anoro  Continue as needed albuterol  Encouraged to quit smoking    Sleep apnea  Based on symptoms and findings I believe patient has obstructive sleep apnea and she needs sleep study specially with her long drive  I counseled her about this condition and she agreed with work-up    Nicotine dependence with current use  I counseled patient for 4 minutes to quit smoking  I believe at this level of cigarette smoking with 3 cigarettes/day she can quit on her own or use nicotine gum  If she is not successful then we can consider Chantix or Wellbutrin  I asked her to set a date and get rid of cigarettes from her car  Encounter for screening for malignant neoplasm of lung in patient with less than 30 pack year smoking history  The most recent CT scan from March 2023 showed a stable lung nodule  We will continue lung cancer screening every year, neck CT scan should be for March 2024    Class 3 severe obesity due to excess calories without serious comorbidity in adult [facility])  Encourage patient to decrease calorie intake and exercise as tolerated to lose weight      Diagnoses and all orders for this visit:    Acute exacerbation of chronic obstructive pulmonary disease (COPD) ([facility] )  -     Complete PFT with post bronchodilator;  Future  -     Discontinue: fluticasone-umeclidinium-vilanterol (Trelegy Ellipta) 100-62 5-25 mcg/actuation inhaler; Inhale 1 puff daily Rinse mouth after use  -     fluticasone-umeclidinium-vilanterol (Trelegy Ellipta) 100-62 5-25 mcg/actuation inhaler; Inhale 1 puff daily Rinse mouth after use  Nicotine dependence with current use    Obstructive sleep apnea syndrome  -     Diagnostic Sleep Study; Future    Encounter for screening for malignant neoplasm of lung in patient with less than 30 pack year smoking history    Class 3 severe obesity due to excess calories without serious comorbidity with body mass index (BMI) of 40 0 to 44 9 in [facility])    Other orders  -     Discontinue: Fluticasone-Salmeterol (Advair) 100-50 mcg/dose inhaler; INHALE 1 DOSE BY MOUTH EVERY 12 HOURS RINSE MOUTH WITH WATER AFTER USE TO REDUCE AFTERTASTE AND INCIDENCE OF CANDIDIASIS  DO NOT SWALLOW (Patient not taking: Reported on 5/2/2023)  -     Discontinue: predniSONE 20 mg tablet; Take 40 mg by mouth daily  -     Discontinue: predniSONE 20 mg tablet; TAKE 2 TABLETS BY MOUTH ONCE DAILY FOR 5 DAYS  -     triamcinolone (KENALOG) 0 1 % oral topical paste; Apply 1 applicator to teeth 2 (two) times a day      ______________________________________________________________________    HPI:    Ginny Agarwal is a 66 y o  female who presents for evaluation of shortness of breath and episodes of COPD exacerbation  Patient was referred to find a pulmonologist by her primary care physician  Patient started to have some dyspnea on exertion associated with wheezing and chest tightness and productive cough of clear sputum recently, she had a prolonged episode requiring hospitalization and treated as COPD exacerbation and currently she is doing fine on Trelegy 100 and as needed albuterol  She rarely needs her as needed albuterol  Patient has mild allergic rhinitis with postnasal drip but currently not on any treatment  She denies GERD or dysphagia  She has history of mild asthma as a child but did not have any issues all her life until recently    Also patient has significant snoring and witnessed apneic episodes and sleep choking  She has excessive daytime sleepiness and recently started to feel sleepy while driving her car long distance  Also in general she sleeps 4-5 hours only  She denies restless leg syndrome  She lives at home with her , she has 2 dogs, she works in preparing films and glasses and she has exposure to some chemical fumes including methanol and acetone but she does not feel any respiratory symptoms with that  She drives to work every day near [place] and sometimes she feels sleepy  She smoked 0 5 pack/day for more than 40 years and recently managed to cut down to 3 cigarettes/day  She could not tolerate nicotine patch  Review of Systems:  Review of Systems   Constitutional: Negative  HENT: Negative  Eyes: Negative  Respiratory: Positive for cough, chest tightness, shortness of breath and wheezing  Cardiovascular: Negative  Gastrointestinal: Negative  Endocrine: Negative  Genitourinary: Negative  Musculoskeletal: Negative  Skin: Negative  Allergic/Immunologic: Negative  Neurological: Negative  Hematological: Negative  Psychiatric/Behavioral: Positive for sleep disturbance  Aside from what is mentioned in the HPI, the review of systems otherwise negative      Current Medications:    Current Outpatient Medications:     albuterol (PROVENTIL HFA,VENTOLIN HFA) 90 mcg/act inhaler, Inhale 2 puffs every 6 (six) hours as needed for wheezing, Disp: , Rfl:     amLODIPine-valsartan (EXFORGE) 5-320 MG per tablet, Take 1 tablet by mouth daily, Disp: , Rfl:     fluticasone-umeclidinium-vilanterol (Trelegy Ellipta) 100-62 5-25 mcg/actuation inhaler, Inhale 1 puff daily Rinse mouth after use , Disp: , Rfl:     Multiple Vitamins-Minerals (WOMENS MULTIVITAMIN PO), Take by mouth, Disp: , Rfl:     predniSONE 20 mg tablet, Take 40 mg by mouth daily, Disp: , Rfl:     rizatriptan (MAXALT) 10 mg tablet, "Take 10 mg by mouth as needed for migraine Take at the onset of migraine; if symptoms continue or return, may take another dose at least 2 hours after first dose  Take no more than 2 doses in a day , Disp: , Rfl:     triamcinolone (KENALOG) 0 1 % oral topical paste, Apply 1 applicator to teeth 2 (two) times a day, Disp: , Rfl:     Calcium Citrate-Vitamin D (CALCIUM + D PO), Take by mouth (Patient not taking: Reported on 3/17/2023), Disp: , Rfl:     Fluticasone-Salmeterol (Advair) 100-50 mcg/dose inhaler, INHALE 1 DOSE BY MOUTH EVERY 12 HOURS RINSE MOUTH WITH WATER AFTER USE TO REDUCE AFTERTASTE AND INCIDENCE OF CANDIDIASIS  DO NOT SWALLOW (Patient not taking: Reported on 5/2/2023), Disp: , Rfl:     nicotine (NICODERM CQ) 14 mg/24hr TD 24 hr patch, Place 1 patch on the skin over 24 hours daily Do not start before March 21, 2023  (Patient not taking: Reported on 5/2/2023), Disp: 28 patch, Rfl: 0    predniSONE 20 mg tablet, TAKE 2 TABLETS BY MOUTH ONCE DAILY FOR 5 DAYS, Disp: , Rfl:     Historical Information   Past Medical History:   Diagnosis Date    Hypertension      History reviewed  No pertinent surgical history  Social History   Social History     Tobacco Use   Smoking Status Some Days    Packs/day: 0 50    Years: 40 00    Pack years: 20 00    Types: Cigarettes    Start date: 1/1/1982   Smokeless Tobacco Never   Tobacco Comments    Down to 3 cigs a day 5/2023       Occupational history:  No occupational exposure    Family History:   History reviewed  No pertinent family history        PhysicalExamination:  Vitals:   /84 (BP Location: Right arm, Patient Position: Sitting, Cuff Size: Large)   Pulse 103   Temp 99 2 °F (37 3 °C) (Tympanic)   Resp 18   Ht 5' 3\" (1 6 m)   Wt 109 kg (240 lb 9 6 oz)   SpO2 96%   BMI 42 62 kg/m²     General: alert, not in acute distress  HEENT: PERRL, no icteric sclera or cyanosis, no thrush, Mallampati 4  Neck: Supple, no lymphadenopathy or thyromegaly, no " JVD  Lungs: Equal breath sounds and clear auscultations bilaterally, no wheezing or crackles  Heart: S1S2 regular, no murmurs or gallops  Abdomen: soft, nontender, bowel sounds present  Extremities: no edema, no clubbing or cyanosis  Neuro: Alert and oriented x 3, no focal neurodeficits   Skin: intact, no rashes      Diagnostic Data:  Labs:   I personally reviewed the most recent laboratory data pertinent to today's visit    Lab Results   Component Value Date    WBC 11 30 (H) 03/18/2023    HGB 14 2 03/18/2023    HCT 42 7 03/18/2023    MCV 97 03/18/2023     03/18/2023     Lab Results   Component Value Date    CALCIUM 9 4 03/18/2023    K 4 3 03/18/2023    CO2 23 03/18/2023     03/18/2023    BUN 15 03/18/2023    CREATININE 0 75 03/18/2023     No results found for: IGE  Lab Results   Component Value Date    ALT 31 03/17/2023    AST 23 03/17/2023    ALKPHOS 73 03/17/2023         Imaging:  I personally reviewed the images on the [facility] system pertinent to today's visit  Chest CT scan reviewed on a CD brought by the patient to the office: Clear lungs parenchyma, no emphysema, no masses or lesions, stable 4 mm lung nodule in the left lower lobe    Chest x-ray reviewed on PACS: Clear lungs        Shannan Guardado MD

## 2023-05-05 ENCOUNTER — TELEPHONE (OUTPATIENT)
Dept: SLEEP CENTER | Facility: CLINIC | Age: 58
End: 2023-05-05

## 2023-05-05 NOTE — TELEPHONE ENCOUNTER
----- Message from Jeb Duane, MD sent at 5/4/2023  7:10 PM EDT -----  approved  ----- Message -----  From: Sarthak Plascencia  Sent: 5/3/2023   6:59 AM EDT  To: Sleep Medicine [facility] Provider    This diagnostic sleep study needs approval      If approved please sign and return to clerical pool  If denied please include reasons why  Also provide alternative testing if warranted  Please sign and return to clerical pool

## 2023-05-09 ENCOUNTER — HOSPITAL ENCOUNTER (OUTPATIENT)
Dept: PULMONOLOGY | Facility: HOSPITAL | Age: 58
Discharge: HOME | End: 2023-05-09
Attending: INTERNAL MEDICINE

## 2023-05-09 DIAGNOSIS — J44.1 ACUTE EXACERBATION OF CHRONIC OBSTRUCTIVE PULMONARY DISEASE (COPD) (HCC): ICD-10-CM

## 2023-05-09 RX ORDER — ALBUTEROL SULFATE 2.5 MG/3ML
2.5 SOLUTION RESPIRATORY (INHALATION) ONCE
Status: COMPLETED | OUTPATIENT
Start: 2023-05-09 | End: 2023-05-09

## 2023-05-09 RX ADMIN — ALBUTEROL SULFATE 2.5 MG: 2.5 SOLUTION RESPIRATORY (INHALATION) at 16:37

## 2023-06-02 ENCOUNTER — HOSPITAL ENCOUNTER (OUTPATIENT)
Dept: SLEEP CENTER | Facility: CLINIC | Age: 58
Discharge: HOME | End: 2023-06-02
Payer: COMMERCIAL

## 2023-06-02 DIAGNOSIS — G47.33 OBSTRUCTIVE SLEEP APNEA SYNDROME: ICD-10-CM

## 2023-06-02 PROCEDURE — 95810 POLYSOM 6/> YRS 4/> PARAM: CPT

## 2023-06-02 PROCEDURE — 95810 POLYSOM 6/> YRS 4/> PARAM: CPT | Performed by: INTERNAL MEDICINE

## 2023-06-03 NOTE — PROGRESS NOTES
Sleep Study Documentation    Pre-Sleep Study       Sleep testing procedure explained to patient:YES    Patient napped prior to study:NO    [address] [place] worker after 12PM   Caffeine use:NO    Alcohol:Dayshift workers after 5PM: Alcohol use:NO    Typical day for patient:YES       Study Documentation    Sleep Study Indications: EDS,Witnessed apneas    Sleep Study: Diagnostic   Snore: Moderate  Supplemental O2: no      Minimum SaO2 91 7%  Baseline SaO2 63%      EKG abnormalities: no     EEG abnormalities: no    Sleep Study Recorded < 2 hours: N/A    Sleep Study Recorded > 2 hours but incomplete study: N/A    Sleep Study Recorded 6 hours but no sleep obtained: NO    Patient classification: employed       Post-Sleep Study    Medication used at bedtime or during sleep study:NO    Patient reports time it took to fall asleep:30 to 60 minutes    Patient reports waking up during study:3 or more times  Patient reports returning to sleep in greater than 30 minutes  Patient reports sleeping 4 to 6 hours without dreaming  Patient reports sleep during study:typical    Patient rated sleepiness: Somewhat sleepy or tired    PAP treatment:no

## 2023-06-06 DIAGNOSIS — G47.33 OBSTRUCTIVE SLEEP APNEA SYNDROME: Primary | ICD-10-CM

## 2023-06-09 ENCOUNTER — TELEPHONE (OUTPATIENT)
Dept: SLEEP CENTER | Facility: CLINIC | Age: 58
End: 2023-06-09

## 2023-06-09 NOTE — TELEPHONE ENCOUNTER
----- Message from Adams Burgos MD sent at 6/8/2023 12:44 PM EDT -----  Hi, this pulmonary patient is scheduled for February 2024 for CPAP titration  I am hoping to put her on the cancellation list   I have already messaged Max[employer]ie about getting an early authorization    If Dr Marlen Shi prefers, he can order auto titrating CPAP for her in the interim

## 2023-06-09 NOTE — TELEPHONE ENCOUNTER
Patient of Dr Lobito Morales in the [facility] pulmonary office  Left message for patient to call office to review sleep study results  Sleep study resulted and shows severe HENRY (AHI 68 7)  CPAP study ordered and is scheduled 2/9/2024  Teams message sent to sleep center office staff to expedite study

## 2023-07-31 ENCOUNTER — TELEPHONE (OUTPATIENT)
Dept: PULMONOLOGY | Facility: CLINIC | Age: 58
End: 2023-07-31

## 2023-07-31 NOTE — TELEPHONE ENCOUNTER
Pt calling in C/O of weight gain since starting the trelegy. She is asking if this is a side effect of the medication?

## 2023-08-10 ENCOUNTER — TELEPHONE (OUTPATIENT)
Dept: PULMONOLOGY | Facility: CLINIC | Age: 58
End: 2023-08-10

## 2023-08-10 NOTE — TELEPHONE ENCOUNTER
Lm for pt, Dr. Cesar Sanchse won't be in the [facility] office on 9/22, dorota to call back to reschedule.

## 2023-08-23 DIAGNOSIS — G47.33 OBSTRUCTIVE SLEEP APNEA SYNDROME: Primary | ICD-10-CM

## 2023-08-23 LAB
DME PARACHUTE DELIVERY DATE REQUESTED: NORMAL
DME PARACHUTE ITEM DESCRIPTION: NORMAL
DME PARACHUTE ORDER STATUS: NORMAL
DME PARACHUTE SUPPLIER NAME: NORMAL
DME PARACHUTE SUPPLIER PHONE: NORMAL

## 2023-09-01 LAB

## 2023-09-21 ENCOUNTER — DOCUMENTATION (OUTPATIENT)
Dept: PULMONOLOGY | Facility: CLINIC | Age: 58
End: 2023-09-21

## 2023-11-17 RX ORDER — ROSUVASTATIN CALCIUM 5 MG/1
5 TABLET, COATED ORAL DAILY
COMMUNITY
Start: 2023-10-04 | End: 2024-10-03

## 2023-11-17 RX ORDER — NICOTINE 21 MG/24HR
1 PATCH, TRANSDERMAL 24 HOURS TRANSDERMAL EVERY 24 HOURS
COMMUNITY
Start: 2023-10-04

## 2023-11-17 RX ORDER — LEVOTHYROXINE SODIUM 0.03 MG/1
25 TABLET ORAL DAILY
COMMUNITY
Start: 2023-10-04 | End: 2024-10-03

## 2023-11-20 ENCOUNTER — OFFICE VISIT (OUTPATIENT)
Dept: PULMONOLOGY | Facility: CLINIC | Age: 58
End: 2023-11-20
Payer: COMMERCIAL

## 2023-11-20 VITALS
BODY MASS INDEX: 43.41 KG/M2 | TEMPERATURE: 99.5 F | DIASTOLIC BLOOD PRESSURE: 76 MMHG | OXYGEN SATURATION: 95 % | WEIGHT: 245 LBS | HEIGHT: 63 IN | SYSTOLIC BLOOD PRESSURE: 136 MMHG | HEART RATE: 108 BPM

## 2023-11-20 DIAGNOSIS — Z12.2 ENCOUNTER FOR SCREENING FOR MALIGNANT NEOPLASM OF LUNG IN PATIENT WITH LESS THAN 30 PACK YEAR SMOKING HISTORY: ICD-10-CM

## 2023-11-20 DIAGNOSIS — G47.33 OBSTRUCTIVE SLEEP APNEA SYNDROME: ICD-10-CM

## 2023-11-20 DIAGNOSIS — E66.01 CLASS 3 SEVERE OBESITY DUE TO EXCESS CALORIES WITHOUT SERIOUS COMORBIDITY WITH BODY MASS INDEX (BMI) OF 40.0 TO 44.9 IN ADULT (HCC): ICD-10-CM

## 2023-11-20 DIAGNOSIS — J45.40 MODERATE PERSISTENT ASTHMA WITHOUT COMPLICATION: Primary | ICD-10-CM

## 2023-11-20 DIAGNOSIS — Z87.891 ENCOUNTER FOR SCREENING FOR MALIGNANT NEOPLASM OF LUNG IN PATIENT WITH LESS THAN 30 PACK YEAR SMOKING HISTORY: ICD-10-CM

## 2023-11-20 DIAGNOSIS — F17.200 NICOTINE DEPENDENCE WITH CURRENT USE: ICD-10-CM

## 2023-11-20 PROCEDURE — 99214 OFFICE O/P EST MOD 30 MIN: CPT | Performed by: INTERNAL MEDICINE

## 2023-11-20 RX ORDER — FLUTICASONE PROPIONATE AND SALMETEROL 250; 50 UG/1; UG/1
1 POWDER RESPIRATORY (INHALATION) 2 TIMES DAILY
Qty: 60 BLISTER | Refills: 5 | Status: SHIPPED | OUTPATIENT
Start: 2023-11-20

## 2023-11-20 NOTE — PROGRESS NOTES
Progress note - Pulmonary Medicine   Kain Murphy 66 y.o. female MRN: [id number]       Impression & Plan: Moderate persistent asthma without complication  Based on spirometry she does not have obstruction and diffusion capacity is normal so patient does not have COPD regardless of her smoking history. I explained that to her. She has only asthma and for that reason I will change her triple inhaler therapy to ICS/LABA only. We will switch to [facility] Advair 250/50 twice daily and explained to her how to use. Continue as needed albuterol. Encouraged her to quit smoking. Obstructive sleep apnea syndrome  We spoke about the need to treat her severe obstructive sleep apnea, she has a lot of questions about her device and how to use and also the inspire, I explained to her that the inspire may not be suitable for her severe sleep apnea but also the other factor is her obesity/BMI and she has 2 significant weight. I will refer patient to sleep clinic to set with one of my colleagues and discuss all these and see if there is anything else to help her. I initially I wanted to prescribe nasal prongs but she tried that but I believe maybe she did not try it at home. We will follow-up after she sees sleep. Encounter for screening for malignant neoplasm of lung in patient with less than 30 pack year smoking history  I discussed with her that she is a candidate for lung cancer CT screening. The following Shared Decision-Making points were covered:  Benefits of screening were discussed, including the rates of reduction in death from lung cancer and other causes. Harms of screening were reviewed, including false positive tests, radiation exposure levels, risks of invasive procedures, risks of complications of screening, and risk of overdiagnosis.   I counseled on the importance of adherence to annual lung cancer LDCT screening, impact of co-morbidities, and ability or willingness to undergo diagnosis and treatment. I counseled on the importance of maintaining abstinence as a former smoker or was counseled on the importance of smoking cessation if a current smoker    Review of Eligibility Criteria: She meets all of the criteria for Lung Cancer Screening. She is 66 y.o. She has 20 pack year tobacco history and is a current smoker or has quit within the past 15 years  She presents no signs or symptoms of lung cancer    After discussion, the patient decided to elect lung cancer screening. Class 3 severe obesity due to excess calories without serious comorbidity in [facility])  Courage patient to decrease calorie intake and exercise to lose weight specially if she wants to go with the inspire device for sleep apnea    Nicotine dependence with current use  Continue nicotine patch, patient is doing effort to quit and I told her to set an end date probably the end of this year so she can quit completely. Verbalized understanding. Return in about 4 months (around 3/20/2024). Diagnoses and all orders for this visit:    Moderate persistent asthma without complication  -     Fluticasone-Salmeterol (Wixela Inhub) 250-50 mcg/dose inhaler; Inhale 1 puff 2 (two) times a day Rinse mouth after use. Class 3 severe obesity due to excess calories without serious comorbidity with body mass index (BMI) of 40.0 to 44.9 in adult [facility])    Obstructive sleep apnea syndrome  -     Ambulatory Referral to Sleep Medicine; Future    Nicotine dependence with current use  -     CT lung screening program; Future    Encounter for screening for malignant neoplasm of lung in patient with less than 30 pack year smoking history  -     CT lung screening program; Future    Other orders  -     rosuvastatin (CRESTOR) 5 mg tablet; Take 5 mg by mouth daily  -     nicotine (NICODERM CQ) 14 mg/24hr TD 24 hr patch; Place 1 patch on the skin every 24 hours  -     levothyroxine 25 mcg tablet;  Take 25 mcg by mouth daily    She received influenza vaccine recently, she is up-to-date with Prevnar 20 last year, and she inquired about the RSV vaccine and I encouraged her to get.  ______________________________________________________________________    HPI:    Abel Maguire presents today for follow-up of asthma and presumed COPD. Patient had asthma/COPD exacerbation in the past and when I saw her she was improving already on Trelegy and as needed albuterol. She presents today feeling much better and rarely needs her GA albuterol, she is on Trelegy once daily and she requested to stop because of her expensive co-pay. She has mild dyspnea exertion on rare occasions with rarely cough and no wheezing. Denies chest pain or tightness, denies fever chills or night sweats. Known to have asthma earlier in life  Patient was diagnosed with severe obstructive sleep apnea and received CPAP from a friend and she had it set up and she tried to use for 2 weeks and tried multiple masks including nasal prongs but she could not tolerate, she starts well but after an hour or so she will wake up choking and she takes it off and also she complained of dryness even with the humidity she feels that she does not know how to use. She also has questions about the inspire device. Otherwise patient unfortunately continues to smoke cigarettes but she managed to cut down significantly from 1 pack every 2 days to 1 pack every week with nicotine patch.     Current Medications:    Current Outpatient Medications:     albuterol (PROVENTIL HFA,VENTOLIN HFA) 90 mcg/act inhaler, Inhale 2 puffs every 6 (six) hours as needed for wheezing, Disp: , Rfl:     amLODIPine-valsartan (EXFORGE) 5-320 MG per tablet, Take 1 tablet by mouth daily, Disp: , Rfl:     fluticasone-umeclidinium-vilanterol (Trelegy Ellipta) 100-62.5-25 mcg/actuation inhaler, Inhale 1 puff daily Rinse mouth after use., Disp: 60 blister, Rfl: 6    levothyroxine 25 mcg tablet, Take 25 mcg by mouth daily, Disp: , Rfl:     Multiple Vitamins-Minerals (WOMENS MULTIVITAMIN PO), Take by mouth, Disp: , Rfl:     nicotine (NICODERM CQ) 14 mg/24hr TD 24 hr patch, Place 1 patch on the skin every 24 hours, Disp: , Rfl:     rizatriptan (MAXALT) 10 mg tablet, Take 10 mg by mouth as needed for migraine Take at the onset of migraine; if symptoms continue or return, may take another dose at least 2 hours after first dose. Take no more than 2 doses in a day., Disp: , Rfl:     rosuvastatin (CRESTOR) 5 mg tablet, Take 5 mg by mouth daily, Disp: , Rfl:     Calcium Citrate-Vitamin D (CALCIUM + D PO), Take by mouth (Patient not taking: Reported on 3/17/2023), Disp: , Rfl:     triamcinolone (KENALOG) 0.1 % oral topical paste, Apply 1 applicator to teeth 2 (two) times a day, Disp: , Rfl:     Review of Systems:  Review of Systems   Constitutional: Negative. HENT: Negative. Eyes: Negative. Respiratory:  Positive for cough and shortness of breath. Cardiovascular: Negative. Gastrointestinal: Negative. Endocrine: Negative. Genitourinary: Negative. Musculoskeletal: Negative. Skin: Negative. Allergic/Immunologic: Negative. Neurological: Negative. Hematological: Negative. Psychiatric/Behavioral: Negative. Aside from what is mentioned in the HPI, the review of systems is otherwise negative    Past medical history, surgical history, and family history were reviewed and updated as appropriate    Social history updates:  Social History     Tobacco Use   Smoking Status Some Days    Packs/day: 0.50    Years: 40.00    Total pack years: 20.00    Types: Cigarettes    Start date: 1/1/1982   Smokeless Tobacco Never   Tobacco Comments    Down to 3 cigs a day 5/2023       PhysicalExamination:  Vitals:   /76 (BP Location: Right arm, Patient Position: Sitting, Cuff Size: Large)   Pulse (!) 108   Temp 99.5 °F (37.5 °C) (Tympanic)   Ht 5' 3" (1.6 m)   Wt 111 kg (245 lb)   SpO2 95%   BMI 43.40 kg/m²     Gen:  Comfortable on room air. No conversational dyspnea  HEENT:  Conjugate gaze. sclerae anicteric. Oropharynx moist  Neck: Trachea is midline. No JVD. No adenopathy  Chest: Equal breath sounds and clear to auscultation bilaterally, no wheezing or crackles  Cardiac: S1-S2 regular. no murmur  Abdomen:  benign  Extremities: No edema  Neuro:  Normal speech and mentation    Diagnostic Data:  Labs: I personally reviewed the most recent laboratory data pertinent to today's visit    Lab Results   Component Value Date    WBC 11.30 (H) 03/18/2023    HGB 14.2 03/18/2023    HCT 42.7 03/18/2023    MCV 97 03/18/2023     03/18/2023     Lab Results   Component Value Date    SODIUM 136 03/18/2023    K 4.3 03/18/2023    CO2 23 03/18/2023     03/18/2023    BUN 15 03/18/2023    CREATININE 0.75 03/18/2023    CALCIUM 9.4 03/18/2023       PFT results: The most recent pulmonary function tests were reviewed. Normal spirometry     No significant improvement in airflow or forced vital capacity in response to the administration to bronchodilator per ATS standards. Normal lung volumes     Normal diffusion capacity     Normal flow-volume loop    Imaging:  I personally reviewed the images on the [address] system pertinent to today's visit  Chest CT scan reviewed on a CD brought by the patient to the office: Clear lungs parenchyma, no emphysema, no masses or lesions, stable 4 mm lung nodule in the left lower lobe     Chest x-ray reviewed on PACS: Clear lungs       Other studies:  Sleep study:  IMPRESSION:   1. Severe obstructive sleep apnea  2. Normal baseline oxygenation with apnea/hypopnea related oxygen desaturations  3. Normal sleep efficiency with shortened sleep latency, normal REM latency. There was no N3 and mildly decreased REM sleep noted. 4. No significant periodic limb movements  5. EKG demonstrated normal sinus rhythm     RECOMMENDATION:  Due to the severity of the obstructive sleep apnea, a CPAP titration study is recommended.   If a titration study is not feasible, a trial on auto titrating CPAP 4-20cm H2O with close compliance follow up can be considered.         Sandy Schofield MD

## 2023-11-20 NOTE — ASSESSMENT & PLAN NOTE
Continue nicotine patch, patient is doing effort to quit and I told her to set an end date probably the end of this year so she can quit completely. Verbalized understanding.

## 2023-11-20 NOTE — ASSESSMENT & PLAN NOTE
Courage patient to decrease calorie intake and exercise to lose weight specially if she wants to go with the inspire device for sleep apnea

## 2023-11-20 NOTE — ASSESSMENT & PLAN NOTE
We spoke about the need to treat her severe obstructive sleep apnea, she has a lot of questions about her device and how to use and also the inspire, I explained to her that the inspire may not be suitable for her severe sleep apnea but also the other factor is her obesity/BMI and she has 2 significant weight. I will refer patient to sleep clinic to set with one of my colleagues and discuss all these and see if there is anything else to help her. I initially I wanted to prescribe nasal prongs but she tried that but I believe maybe she did not try it at home. We will follow-up after she sees sleep.

## 2023-11-20 NOTE — ASSESSMENT & PLAN NOTE
Based on spirometry she does not have obstruction and diffusion capacity is normal so patient does not have COPD regardless of her smoking history. I explained that to her. She has only asthma and for that reason I will change her triple inhaler therapy to ICS/LABA only. We will switch to [facility] Advair 250/50 twice daily and explained to her how to use. Continue as needed albuterol. Encouraged her to quit smoking.

## 2023-11-20 NOTE — ASSESSMENT & PLAN NOTE
I discussed with her that she is a candidate for lung cancer CT screening. The following Shared Decision-Making points were covered:  Benefits of screening were discussed, including the rates of reduction in death from lung cancer and other causes. Harms of screening were reviewed, including false positive tests, radiation exposure levels, risks of invasive procedures, risks of complications of screening, and risk of overdiagnosis. I counseled on the importance of adherence to annual lung cancer LDCT screening, impact of co-morbidities, and ability or willingness to undergo diagnosis and treatment. I counseled on the importance of maintaining abstinence as a former smoker or was counseled on the importance of smoking cessation if a current smoker    Review of Eligibility Criteria: She meets all of the criteria for Lung Cancer Screening. She is 66 y.o. She has 20 pack year tobacco history and is a current smoker or has quit within the past 15 years  She presents no signs or symptoms of lung cancer    After discussion, the patient decided to elect lung cancer screening.

## 2023-12-11 ENCOUNTER — HOSPITAL ENCOUNTER (EMERGENCY)
Facility: HOSPITAL | Age: 58
Discharge: HOME | End: 2023-12-11
Attending: EMERGENCY MEDICINE | Admitting: EMERGENCY MEDICINE
Payer: COMMERCIAL

## 2023-12-11 ENCOUNTER — APPOINTMENT (EMERGENCY)
Dept: RADIOLOGY | Facility: HOSPITAL | Age: 58
End: 2023-12-11
Payer: COMMERCIAL

## 2023-12-11 VITALS
DIASTOLIC BLOOD PRESSURE: 70 MMHG | RESPIRATION RATE: 18 BRPM | SYSTOLIC BLOOD PRESSURE: 147 MMHG | HEART RATE: 107 BPM | OXYGEN SATURATION: 97 % | TEMPERATURE: 98.5 F

## 2023-12-11 DIAGNOSIS — M25.561 RIGHT ANTERIOR KNEE PAIN: Primary | ICD-10-CM

## 2023-12-11 PROCEDURE — 99284 EMERGENCY DEPT VISIT MOD MDM: CPT | Performed by: EMERGENCY MEDICINE

## 2023-12-11 PROCEDURE — 99283 EMERGENCY DEPT VISIT LOW MDM: CPT

## 2023-12-11 PROCEDURE — 73564 X-RAY EXAM KNEE 4 OR MORE: CPT

## 2023-12-11 RX ORDER — KETOROLAC TROMETHAMINE 30 MG/ML
10 INJECTION, SOLUTION INTRAMUSCULAR; INTRAVENOUS ONCE
Status: DISCONTINUED | OUTPATIENT
Start: 2023-12-11 | End: 2023-12-11

## 2023-12-11 RX ORDER — OXYCODONE HYDROCHLORIDE 5 MG/1
5 TABLET ORAL ONCE
Status: COMPLETED | OUTPATIENT
Start: 2023-12-11 | End: 2023-12-11

## 2023-12-11 RX ORDER — OXYCODONE HYDROCHLORIDE 5 MG/1
5 TABLET ORAL EVERY 6 HOURS PRN
Qty: 10 TABLET | Refills: 0 | Status: SHIPPED | OUTPATIENT
Start: 2023-12-11

## 2023-12-11 RX ADMIN — OXYCODONE HYDROCHLORIDE 5 MG: 5 TABLET ORAL at 18:50

## 2023-12-11 NOTE — Clinical Note
Jenn Hall was seen and treated in our emergency department on 12/11/2023. Diagnosis:     Mickie Guillermo  may return to work on return date. She may return on this date: 12/12/2023    Work restrictions  must stay of of right knee for 30 minutes out of every hour until seen by orthopedist      If you have any questions or concerns, please don't hesitate to call.       Gagandeep Shelton MD    ______________________________           _______________          _______________  Hospital Representative                              Date                                Time yes

## 2023-12-12 NOTE — DISCHARGE INSTRUCTIONS
Diagnosis; right anterior knee pain     - please call the orthopedist- I placed an order in the computer for you     - for pain- over the counter generic acetaminophen 500 mg every 4 hrs while awake - fro severe pain on an as needed  basis- oxycodone 1 tablet     - can try over the counter lidocaine patches to area    - can use knee immobilizer  as needed- can also get hinged knee brace at medical supply pharmacy     - please return to  the er for any worsening/ intractable right knee pain- any right knee swelling- any inability to walk

## 2023-12-12 NOTE — ED PROVIDER NOTES
History  Chief Complaint   Patient presents with   • Knee Pain     Reports right knee pain in joint that's worsening over the past 6 weeks. Reports pain on ambulation, reports ortho appt that was cancelled and had to be rescheduled by provider. Reports knee pain worsened after starting Statin and provider took her off the med. No relief with Meloxicam.     58  YR FEMALE WITH NO SPECIFIC INJURY --  C/O 6 WEEKS OF ANTERIOR KNEE PAIN PAIN --  NO INJURY --  WORSE WHEN BEARING WEIGHT-- NO SWELLING/REDNESS-  NO RLE CLAUDICATION TYPE SYMPTOMS- NO CLAUDICATION TYPE SYMPTOMS- NO REL WEAKNESS/LOSS OF SENSATION       History provided by:  Spouse and patient   used: No    Knee Pain  Location:  Knee  Time since incident:  6 weeks  Injury: no    Knee location:  R knee  Pain details:     Quality:  Aching      Prior to Admission Medications   Prescriptions Last Dose Informant Patient Reported? Taking? Calcium Citrate-Vitamin D (CALCIUM + D PO)  Self Yes No   Sig: Take by mouth   Patient not taking: Reported on 3/17/2023   Fluticasone-Salmeterol (Inell Petite) 250-50 mcg/dose inhaler   No No   Sig: Inhale 1 puff 2 (two) times a day Rinse mouth after use. Multiple Vitamins-Minerals (WOMENS MULTIVITAMIN PO)  Self Yes No   Sig: Take by mouth   albuterol (PROVENTIL HFA,VENTOLIN HFA) 90 mcg/act inhaler  Self Yes No   Sig: Inhale 2 puffs every 6 (six) hours as needed for wheezing   amLODIPine-valsartan (EXFORGE) 5-320 MG per tablet  Self Yes No   Sig: Take 1 tablet by mouth daily   levothyroxine 25 mcg tablet  Self Yes No   Sig: Take 25 mcg by mouth daily   nicotine (NICODERM CQ) 14 mg/24hr TD 24 hr patch  Self Yes No   Sig: Place 1 patch on the skin every 24 hours   rizatriptan (MAXALT) 10 mg tablet  Self Yes No   Sig: Take 10 mg by mouth as needed for migraine Take at the onset of migraine; if symptoms continue or return, may take another dose at least 2 hours after first dose. Take no more than 2 doses in a day. rosuvastatin (CRESTOR) 5 mg tablet  Self Yes No   Sig: Take 5 mg by mouth daily   triamcinolone (KENALOG) 0.1 % oral topical paste  Self Yes No   Sig: Apply 1 applicator to teeth 2 (two) times a day      Facility-Administered Medications: None       Past Medical History:   Diagnosis Date   • Hypertension        History reviewed. No pertinent surgical history. History reviewed. No pertinent family history. I have reviewed and agree with the history as documented. E-Cigarette/Vaping   • E-Cigarette Use Never User      E-Cigarette/Vaping Substances   • Nicotine No    • THC No    • CBD Yes    • Flavoring No    • Other No    • Unknown No      Social History     Tobacco Use   • Smoking status: Some Days     Packs/day: 0.50     Years: 40.00     Total pack years: 20.00     Types: Cigarettes     Start date: 1/1/1982   • Smokeless tobacco: Never   • Tobacco comments:     Down to 3 cigs a day 5/2023   Vaping Use   • Vaping Use: Never used   Substance Use Topics   • Alcohol use: Yes     Comment: Social   • Drug use: Yes     Types: Marijuana     Comment: Occasionally… migraine       Review of Systems   Constitutional: Negative. HENT: Negative. Eyes: Negative. Respiratory: Negative. Cardiovascular: Negative. Gastrointestinal: Negative. Endocrine: Negative. Genitourinary: Negative. Musculoskeletal: Negative. R ANTERIOR KNEE PAIN FOR 6 WEEKS    Skin: Negative. Allergic/Immunologic: Negative. Neurological: Negative. Hematological: Negative. Psychiatric/Behavioral: Negative. Physical Exam  Physical Exam  Vitals and nursing note reviewed. Constitutional:       General: She is not in acute distress. Appearance: Normal appearance. She is not ill-appearing, toxic-appearing or diaphoretic. Comments: AVSS- MILD TACHYCARDIA-  PULSE OX 97 % ON RA- INTERPRETATION IS NORMAL- NO INTERVENTION    Musculoskeletal:         General: Tenderness present.  No swelling, deformity or signs of injury. Right lower leg: No edema. Left lower leg: No edema. Comments: RLE- NT AT HIP/INGUINAL/PELVIC AREA--  R KNEE- POS PATELLAR TENDERNESS TO PALPATION - NO EFFUSION- MILD PAIN UPON/FLEXION/EXTENSION- NO FIB HEAD TENDERNESS- NO LIG LAXITY - NORMAL RLE DISTAL PULSE-SENSATION/STRENGTH/ROM    Neurological:      Mental Status: She is alert. Vital Signs  ED Triage Vitals   Temperature Pulse Respirations Blood Pressure SpO2   12/11/23 1759 12/11/23 1759 12/11/23 1759 12/11/23 1759 12/11/23 1759   98.5 °F (36.9 °C) (!) 107 18 147/70 97 %      Temp Source Heart Rate Source Patient Position - Orthostatic VS BP Location FiO2 (%)   12/11/23 1759 12/11/23 1759 12/11/23 1759 12/11/23 1759 --   Oral Monitor Sitting Right arm       Pain Score       12/11/23 1850       10 - Worst Possible Pain           Vitals:    12/11/23 1759   BP: 147/70   Pulse: (!) 107   Patient Position - Orthostatic VS: Sitting         Visual Acuity      ED Medications  Medications   oxyCODONE (ROXICODONE) IR tablet 5 mg (5 mg Oral Given 12/11/23 1850)       Diagnostic Studies  Results Reviewed       None                   XR knee 4+ views Right injury    (Results Pending)              Procedures  Procedures         ED Course  ED Course as of 12/13/23 1157   Mon Dec 11, 2023   1913 R KNEE XRAY - OSTEOPHYTE BEHIND KNEE--   NO BONY LESIONS   2010 - ER MD -NOT PT ABLE TO AMBULATE WITH  R KNEE IMMOBILIZER- AND HER WALKER - FEELS IMPROVED - WILL D/C                               SBIRT 20yo+      Flowsheet Row Most Recent Value   Initial Alcohol Screen: US AUDIT-C     1. How often do you have a drink containing alcohol? 0 Filed at: 12/11/2023 1845   2. How many drinks containing alcohol do you have on a typical day you are drinking? 0 Filed at: 12/11/2023 1845   3b. FEMALE Any Age, or MALE 65+: How often do you have 4 or more drinks on one occassion?  0 Filed at: 12/11/2023 1845   Audit-C Score 0 Filed at: 12/11/2023 1845 CARMELA: How many times in the past year have you. .. Used an illegal drug or used a prescription medication for non-medical reasons? Never Filed at: 12/11/2023 1848                      Medical Decision Making  Amount and/or Complexity of Data Reviewed  Radiology: ordered. Risk  Prescription drug management. Disposition  Final diagnoses:   Right anterior knee pain     Time reflects when diagnosis was documented in both MDM as applicable and the Disposition within this note       Time User Action Codes Description Comment    12/11/2023  7:39 PM Sundeep Cruz Add [J13.971] Right anterior knee pain           ED Disposition       ED Disposition   Discharge    Condition   Stable    Date/Time   Mon Dec 11, 2023 629 [facility] discharge to home/self care. Follow-up Information    None         Patient's Medications   Discharge Prescriptions    No medications on file       No discharge procedures on file.     PDMP Review       None            ED Provider  Electronically Signed by             Sundeep Cruz MD  12/13/23 7623

## 2023-12-13 ENCOUNTER — OFFICE VISIT (OUTPATIENT)
Dept: OBGYN CLINIC | Facility: CLINIC | Age: 58
End: 2023-12-13
Payer: COMMERCIAL

## 2023-12-13 VITALS
SYSTOLIC BLOOD PRESSURE: 123 MMHG | HEART RATE: 112 BPM | BODY MASS INDEX: 43.41 KG/M2 | HEIGHT: 63 IN | DIASTOLIC BLOOD PRESSURE: 78 MMHG | WEIGHT: 245 LBS | RESPIRATION RATE: 17 BRPM

## 2023-12-13 DIAGNOSIS — M25.561 RIGHT ANTERIOR KNEE PAIN: ICD-10-CM

## 2023-12-13 PROCEDURE — 99203 OFFICE O/P NEW LOW 30 MIN: CPT | Performed by: STUDENT IN AN ORGANIZED HEALTH CARE EDUCATION/TRAINING PROGRAM

## 2023-12-13 PROCEDURE — 20610 DRAIN/INJ JOINT/BURSA W/O US: CPT | Performed by: STUDENT IN AN ORGANIZED HEALTH CARE EDUCATION/TRAINING PROGRAM

## 2023-12-13 RX ORDER — MELOXICAM 15 MG/1
15 TABLET ORAL DAILY
COMMUNITY
Start: 2023-12-01 | End: 2024-11-30

## 2023-12-13 RX ORDER — BUPIVACAINE HYDROCHLORIDE 2.5 MG/ML
4 INJECTION, SOLUTION INFILTRATION; PERINEURAL
Status: COMPLETED | OUTPATIENT
Start: 2023-12-13 | End: 2023-12-13

## 2023-12-13 RX ORDER — TRIAMCINOLONE ACETONIDE 40 MG/ML
40 INJECTION, SUSPENSION INTRA-ARTICULAR; INTRAMUSCULAR
Status: COMPLETED | OUTPATIENT
Start: 2023-12-13 | End: 2023-12-13

## 2023-12-13 RX ADMIN — BUPIVACAINE HYDROCHLORIDE 4 ML: 2.5 INJECTION, SOLUTION INFILTRATION; PERINEURAL at 10:45

## 2023-12-13 RX ADMIN — TRIAMCINOLONE ACETONIDE 40 MG: 40 INJECTION, SUSPENSION INTRA-ARTICULAR; INTRAMUSCULAR at 10:45

## 2023-12-13 NOTE — LETTER
December 13, 2023     [facility], [id number] [address] [address]    Patient: Sánchez Bruce   YOB: 1965   Date of Visit: 12/13/2023       Dear Dr. Tata Hernandez: Thank you for referring Sánchez Bruce to me for evaluation. Below are the relevant portions of my assessment and plan of care. If you have questions, please do not hesitate to call me. I look forward to following Vera Michael along with you.          Sincerely,        Leslei Garduno,         CC: No Recipients

## 2023-12-13 NOTE — PROGRESS NOTES
Ortho Sports Medicine Knee New Patient Visit     Assesment:   66 y.o. female right knee suspected medial meniscus tear    Plan:    The patient's diagnosis and treatment were discussed at length today. We discussed no treatment, non-operative treatment, and operative treatment. Zhang Mendes presents today for evaluation of subacute onset right knee pain concerning for suspected medial meniscus tear. I discussed variety of treatment options including no treatment, nonoperative treatment, and operative treatment. We decided proceed with corticosteroid injection as well as anti-inflammatory medications and activity modification. If she has ongoing symptoms despite 3 to 4 weeks of conservative measures we will consider an MRI for additional treatment recommendations. Conservative treatment:    Ice to knee for 20 minutes at least 1-2 times daily. OTC NSAIDS prn for pain. Let pain guide gradual return activities. Imaging: All imaging from today was reviewed by myself and explained to the patient. Injection:    A corticosteroid injection was performed. Surgery:     No surgery is recommended at this point, continue with conservative treatment plan as noted. Follow up:    No follow-ups on file. Chief Complaint   Patient presents with    Right Knee - Pain       History of Present Illness: The patient is a 66 y.o. 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 Pain is located anterior, medial.  The patient rates the pain as a 9/10. The pain has been present for 6 weeks. The patient sustained an injury on jagjit 10/28. The mechanism of injury was uknown. The pain is characterized as sharp. The pain is present at all times worse in the morning, walking up or down stairs. Pain is improved by rest and NSAIDS.   Pain is aggravated by stairs, weight bearing, walking, and standing. The patient has tried rest and NSAIDS. Knee Surgical History:  None    Past Medical, Social and Family History:  Past Medical History:   Diagnosis Date    Hypertension      History reviewed. No pertinent surgical history. Allergies   Allergen Reactions    Penicillins Throat Swelling     Current Outpatient Medications on File Prior to Visit   Medication Sig Dispense Refill    albuterol (PROVENTIL HFA,VENTOLIN HFA) 90 mcg/act inhaler Inhale 2 puffs every 6 (six) hours as needed for wheezing      amLODIPine-valsartan (EXFORGE) 5-320 MG per tablet Take 1 tablet by mouth daily      Fluticasone-Salmeterol (Wixela Inhub) 250-50 mcg/dose inhaler Inhale 1 puff 2 (two) times a day Rinse mouth after use. 60 blister 5    Multiple Vitamins-Minerals (WOMENS MULTIVITAMIN PO) Take by mouth      nicotine (NICODERM CQ) 14 mg/24hr TD 24 hr patch Place 1 patch on the skin every 24 hours      oxyCODONE (Roxicodone) 5 immediate release tablet Take 1 tablet (5 mg total) by mouth every 6 (six) hours as needed for severe pain for up to 10 doses Max Daily Amount: 20 mg 10 tablet 0    Calcium Citrate-Vitamin D (CALCIUM + D PO) Take by mouth (Patient not taking: Reported on 3/17/2023)      levothyroxine 25 mcg tablet Take 25 mcg by mouth daily (Patient not taking: Reported on 12/13/2023)      meloxicam (MOBIC) 15 mg tablet Take 15 mg by mouth daily (Patient not taking: Reported on 12/13/2023)      rizatriptan (MAXALT) 10 mg tablet Take 10 mg by mouth as needed for migraine Take at the onset of migraine; if symptoms continue or return, may take another dose at least 2 hours after first dose. Take no more than 2 doses in a day.  (Patient not taking: Reported on 12/13/2023)      rosuvastatin (CRESTOR) 5 mg tablet Take 5 mg by mouth daily (Patient not taking: Reported on 12/13/2023)      triamcinolone (KENALOG) 0.1 % oral topical paste Apply 1 applicator to teeth 2 (two) times a day       No current facility-administered medications on file prior to visit.      Social History     Socioeconomic History    Marital status: /Civil Union     Spouse name: Not on file    Number of children: Not on file    Years of education: Not on file    Highest education level: Not on file   Occupational History    Not on file   Tobacco Use    Smoking status: Some Days     Current packs/day: 0.50     Average packs/day: 0.5 packs/day for 41.9 years (21.0 ttl pk-yrs)     Types: Cigarettes     Start date: 1/1/1982    Smokeless tobacco: Never    Tobacco comments:     Down to 3 cigs a day 5/2023   Vaping Use    Vaping status: Never Used   Substance and Sexual Activity    Alcohol use: Yes     Comment: Social    Drug use: Yes     Types: Marijuana     Comment: Occasionally… migraine    Sexual activity: Yes     Partners: Male   Other Topics Concern    Not on file   Social History Narrative    Not on file     Social Determinants of Health     Financial Resource Strain: Not At Risk (10/4/2023)    Received from [address] Strain     In the last 12 months did you skip medications to save money?: No     In the last 12 months, was there a time when you needed to see a doctor but could not because of cost?: No   Food Insecurity: Not At Risk (10/4/2023)    Received from [address]     In the last 12 months did you ever eat less than you felt you should because there wasn't enough money for food?: No   Transportation Needs: Not At Risk (10/4/2023)    Received from [address]     In the last 12 months, have you ever had to go without healthcare because you didn't have a way to get there?: No   Physical Activity: Not on file   Stress: Not on file   Social Connections: Not At Risk (10/4/2023)    Received from [address]     Do you often feel lonely?: No   Intimate Partner Violence: Not on file   Housing Stability: Not At Risk (10/4/2023)    Received from [address] [facility]     Are you worried that in the next 2 months you may not have stable housing?: No         I have reviewed the past medical, surgical, social and family history, medications and allergies as documented in the EMR. Review of systems: ROS is negative other than that noted in the HPI. Constitutional: Negative for fatigue and fever. HENT: Negative for sore throat. Respiratory: Negative for shortness of breath. Cardiovascular: Negative for chest pain. Gastrointestinal: Negative for abdominal pain. Endocrine: Negative for cold intolerance and heat intolerance. Genitourinary: Negative for flank pain. Musculoskeletal: Negative for back pain. Skin: Negative for rash. Allergic/Immunologic: Negative for immunocompromised state. Neurological: Negative for dizziness. Psychiatric/Behavioral: Negative for agitation. Physical Exam:    Blood pressure 123/78, pulse (!) 112, resp. rate 17, height 5' 3" (1.6 m), weight 111 kg (245 lb). General/Constitutional: NAD, well developed, well nourished  HENT: Normocephalic, atraumatic  CV: Intact distal pulses, regular rate  Resp: No respiratory distress or labored breathing  Lymphatic: No lymphadenopathy palpated  Neuro: Alert and Oriented x 3, no focal deficits  Psych: Normal mood, normal affect, normal judgement, normal behavior  Skin: Warm, dry, no rashes, no erythema      Knee Exam (focused):  Visual inspection of the right knee demonstrates normal contour without atrophy. No previous incisions   There is no significant erythema or edema.     Slight joint effusion   Range of motion is full from 0-130 degrees of flexion   Able to straight leg raise   Anterior-medial joint  to palpation  yes medial joint line tenderness, No lateral joint line tenderness  positive medial Shakila's, negative lateral Shakila's  Stable to varus and valgus stress at both 0 and 30°  Patella tracks normally. No J sign. No apprehension. Translation is approximately 2 quadrants and is equal to the contralateral side  Patellar eversion is similar to the contralateral side    Examination of the patient's ipsilateral hip demonstrates full painless range of motion. No crepitus. LE NV Exam: +2 DP/PT pulses bilaterally  Sensation intact to light touch L2-S1 bilaterally     Bilateral hip ROM demonstrates no pain actively or passively    No calf tenderness to palpation bilaterally    Knee Imaging    X-rays of the right knee were reviewed, which demonstrate slight medial joint narrowing. I have reviewed the radiology report and agree with their impression. Large joint arthrocentesis: R knee  Universal Protocol:  Consent: Verbal consent obtained. Written consent not obtained. Risks and benefits: risks, benefits and alternatives were discussed  Consent given by: patient  Patient understanding: patient states understanding of the procedure being performed  Patient consent: the patient's understanding of the procedure matches consent given  Procedure consent: procedure consent matches procedure scheduled  Relevant documents: relevant documents present and verified  Test results: test results available and properly labeled  Site marked: the operative site was marked  Radiology Images displayed and confirmed.  If images not available, report reviewed: imaging studies available  Patient identity confirmed: verbally with patient  Supporting Documentation  Indications: pain   Procedure Details  Location: knee - R knee  Needle size: 22 G  Ultrasound guidance: no  Approach: anterolateral  Medications administered: 40 mg triamcinolone acetonide 40 mg/mL; 4 mL bupivacaine 0.25 %              Scribe Attestation      I,:   am acting as a scribe while in the presence of the attending physician.:       I,:   personally performed the services described in this documentation    as scribed in my presence.:

## 2023-12-13 NOTE — LETTER
December 13, 2023     Patient: Veronica Olson  YOB: 1965  Date of Visit: 12/13/2023      To Whom it May Concern:    Veronica Olson is under my professional care. Jasmina Betancourt was seen in my office on 12/13/2023. Jasmina Betancourt may return to work on 12/14/23 . She should be excused from work 12/12, 12/13 for her knee issues. She is able to return to work with a 50lbs lifting restriction and 50% of the time she should be able to sit while working. Please continue to allow her use her assistive devise while working. If you have any questions or concerns, please don't hesitate to call.          Sincerely,          Jt Rutledge DO        CC: No Recipients

## 2023-12-15 ENCOUNTER — OFFICE VISIT (OUTPATIENT)
Dept: OBGYN CLINIC | Facility: CLINIC | Age: 58
End: 2023-12-15
Payer: COMMERCIAL

## 2023-12-15 VITALS
BODY MASS INDEX: 43.41 KG/M2 | RESPIRATION RATE: 18 BRPM | DIASTOLIC BLOOD PRESSURE: 86 MMHG | WEIGHT: 245 LBS | HEIGHT: 63 IN | SYSTOLIC BLOOD PRESSURE: 130 MMHG | HEART RATE: 76 BPM

## 2023-12-15 DIAGNOSIS — M17.11 PRIMARY OSTEOARTHRITIS OF RIGHT KNEE: Primary | ICD-10-CM

## 2023-12-15 PROCEDURE — 99213 OFFICE O/P EST LOW 20 MIN: CPT | Performed by: STUDENT IN AN ORGANIZED HEALTH CARE EDUCATION/TRAINING PROGRAM

## 2023-12-15 NOTE — PROGRESS NOTES
Ortho Sports Medicine Knee Follow Up Visit     Assesment:     66 y.o. female right knee osteoarthritis with concern for possible degenerative meniscus tear. Increased pain following cortisone injection 2 days ago. Plan:  The patient's diagnosis and treatment were discussed at length today. We discussed no treatment, non-operative treatment, and operative treatment. Jad Mendez presents today for follow-up evaluation right knee osteoarthritis concerning for possible degenerative meniscus tear. She is having increased pain following a cortisone injection 2 days ago. I discussed with her that she should allow the injection more time to work. She does not feel like she is able to return to work and would like some time off. I did provide her with a work note stating that she should remain out of work for 2 weeks and able to return on 1/2/2023 with a 20 to 25 pound lifting restriction as well as avoiding deep squatting and twisting. 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 She should continue use of ice and over-the-counter medication. I also provided her with a short hinged knee brace for comfort and stability. I discussed with her she is able to perform activity as tolerated using pain as a guide as well as weight-bear as tolerated. She can follow-up in approximately 4 weeks for reevaluation. Conservative treatment:    Ice to knee for 20 minutes at least 1-2 times daily. PT for ROM/strengthening to knee, hip and core. OTC NSAIDS prn for pain. Let pain guide gradual return activities. Work note provided at today's visit. Weight-bear as tolerated. Short hinged knee brace provided at today's visit. Imaging: All imaging from today was reviewed by myself and explained to the patient. Injection:    No Injection planned at this time.       Surgery:     No surgery is recommended at this point, continue with conservative treatment plan as noted. Follow up:    Return in about 4 weeks (around 1/12/2024) for Recheck. Chief Complaint   Patient presents with    Right Knee - Pain       History of Present Illness: The patient is returns for follow up of right knee pain. She states that yesterday she began exhibiting significant anterior and medial knee pain which she describes as sharp and severe and rated her pain a 9 out of 10. She states that she did have immediate relief from the cortisone injection on 12/13/2023 until she attempted to return to work yesterday and now has significant pain and difficulty weightbearing. She is currently ambulating with assistance of a walker. She denies any new injury or trauma to her knee. She denies any instability. She denies any mechanical symptoms or catching or locking. She is currently managing her pain with ice and over-the-counter medication. Knee Surgical History:  None    Past Medical, Social and Family History:  Past Medical History:   Diagnosis Date    Hypertension      History reviewed. No pertinent surgical history. Allergies   Allergen Reactions    Penicillins Throat Swelling     Current Outpatient Medications on File Prior to Visit   Medication Sig Dispense Refill    albuterol (PROVENTIL HFA,VENTOLIN HFA) 90 mcg/act inhaler Inhale 2 puffs every 6 (six) hours as needed for wheezing      amLODIPine-valsartan (EXFORGE) 5-320 MG per tablet Take 1 tablet by mouth daily      Fluticasone-Salmeterol (Wixela Inhub) 250-50 mcg/dose inhaler Inhale 1 puff 2 (two) times a day Rinse mouth after use.  60 blister 5    Multiple Vitamins-Minerals (WOMENS MULTIVITAMIN PO) Take by mouth      oxyCODONE (Roxicodone) 5 immediate release tablet Take 1 tablet (5 mg total) by mouth every 6 (six) hours as needed for severe pain for up to 10 doses Max Daily Amount: 20 mg 10 tablet 0    Calcium Citrate-Vitamin D (CALCIUM + D PO) Take by mouth (Patient not taking: Reported on 3/17/2023)      levothyroxine 25 mcg tablet Take 25 mcg by mouth daily (Patient not taking: Reported on 12/13/2023)      meloxicam (MOBIC) 15 mg tablet Take 15 mg by mouth daily (Patient not taking: Reported on 12/13/2023)      nicotine (NICODERM CQ) 14 mg/24hr TD 24 hr patch Place 1 patch on the skin every 24 hours      rizatriptan (MAXALT) 10 mg tablet Take 10 mg by mouth as needed for migraine Take at the onset of migraine; if symptoms continue or return, may take another dose at least 2 hours after first dose. Take no more than 2 doses in a day. (Patient not taking: Reported on 12/13/2023)      rosuvastatin (CRESTOR) 5 mg tablet Take 5 mg by mouth daily (Patient not taking: Reported on 12/13/2023)      triamcinolone (KENALOG) 0.1 % oral topical paste Apply 1 applicator to teeth 2 (two) times a day       No current facility-administered medications on file prior to visit.      Social History     Socioeconomic History    Marital status: /Civil Union     Spouse name: Not on file    Number of children: Not on file    Years of education: Not on file    Highest education level: Not on file   Occupational History    Not on file   Tobacco Use    Smoking status: Some Days     Current packs/day: 0.50     Average packs/day: 0.5 packs/day for 42.0 years (21.0 ttl pk-yrs)     Types: Cigarettes     Start date: 1/1/1982    Smokeless tobacco: Never    Tobacco comments:     Down to 3 cigs a day 5/2023   Vaping Use    Vaping status: Never Used   Substance and Sexual Activity    Alcohol use: Yes     Comment: Social    Drug use: Yes     Types: Marijuana     Comment: Occasionally… migraine    Sexual activity: Yes     Partners: Male   Other Topics Concern    Not on file   Social History Narrative    Not on file     Social Determinants of Health     Financial Resource Strain: Not At Risk (10/4/2023)    Received from [address] [place] [address]     In the last 12 months did you skip medications to save money?: No     In the last 12 months, was there a time when you needed to see a doctor but could not because of cost?: No   Food Insecurity: Not At Risk (10/4/2023)    Received from [address]     In the last 12 months did you ever eat less than you felt you should because there wasn't enough money for food?: No   Transportation Needs: Not At Risk (10/4/2023)    Received from [address]     In the last 12 months, have you ever had to go without healthcare because you didn't have a way to get there?: No   Physical Activity: Not on file   Stress: Not on file   Social Connections: Not At Risk (10/4/2023)    Received from [address]     Do you often feel lonely?: No   Intimate Partner Violence: Not on file   Housing Stability: Not At Risk (10/4/2023)    Received from [address] you worried that in the next 2 months you may not have stable housing?: No         I have reviewed the past medical, surgical, social and family history, medications and allergies as documented in the EMR. Review of systems: ROS is negative other than that noted in the HPI. Constitutional: Negative for fatigue and fever. Physical Exam:    Blood pressure 130/86, pulse 76, resp. rate 18, height 5' 3" (1.6 m), weight 111 kg (245 lb). General/Constitutional: NAD, well developed, well nourished  HENT: Normocephalic, atraumatic  CV: Intact distal pulses, regular rate  Resp: No respiratory distress or labored breathing  Lymphatic: No lymphadenopathy palpated  Neuro: Alert and Oriented x 3, no focal deficits  Psych: Normal mood, normal affect, normal judgement, normal behavior  Skin: Warm, dry, no rashes, no erythema      Knee Exam (focused):  Visual inspection of the right knee demonstrates normal contour without atrophy. No previous incisions   There is no significant erythema or edema. Slight joint effusion   Range of motion is full from 0-130 degrees of flexion   Able to straight leg raise   Anterior-medial joint  to palpation  + medial joint line tenderness, No lateral joint line tenderness  positive medial Shakila's, negative lateral Shakila's  Stable to varus and valgus stress at both 0 and 30°  Patella tracks normally. No J sign. No apprehension. Translation is approximately 2 quadrants and is equal to the contralateral side  Patellar eversion is similar to the contralateral side     Examination of the patient's ipsilateral hip demonstrates full painless range of motion. No crepitus. LE NV Exam: +2 DP/PT pulses bilaterally  Sensation intact to light touch L2-S1 bilaterally     Bilateral hip ROM demonstrates no pain actively or passively     No calf tenderness to palpation bilaterally     Knee Imaging     X-rays of the right knee were reviewed, which demonstrate slight medial joint narrowing. I have reviewed the radiology report and agree with their impression.     Scribe Attestation      I,:  Jacqueline Fuller am acting as a scribe while in the presence of the attending physician.:       I,:  Chel Corrales DO personally performed the services described in this documentation    as scribed in my presence.:

## 2023-12-15 NOTE — LETTER
December 15, 2023     Patient: Yevgeniy Rahman  YOB: 1965  Date of Visit: 12/15/2023      To Whom it May Concern:    Yevgeniy Rahman is under my professional care. Ferdinand Frazier was seen in my office on 12/15/2023. Ferdinand Frazier she remain out of work until 1/2/2023. Upon her return to work she should have a 25 pound lifting restriction avoid deep squatting greater than 90 degrees as well as twisting. If you have any questions or concerns, please don't hesitate to call.          Sincerely,          Suzy Mars,         CC: No Recipients

## 2023-12-21 ENCOUNTER — TELEPHONE (OUTPATIENT)
Age: 58
End: 2023-12-21

## 2023-12-21 NOTE — TELEPHONE ENCOUNTER
Verified that FMLA/STD paperwork was scanned in chart.  Paperwork is dated 12/19/23.  Advised 10-14 business days to complete.

## 2024-01-03 ENCOUNTER — OFFICE VISIT (OUTPATIENT)
Dept: OBGYN CLINIC | Facility: CLINIC | Age: 59
End: 2024-01-03
Payer: COMMERCIAL

## 2024-01-03 VITALS
SYSTOLIC BLOOD PRESSURE: 106 MMHG | RESPIRATION RATE: 16 BRPM | BODY MASS INDEX: 43.41 KG/M2 | WEIGHT: 245 LBS | DIASTOLIC BLOOD PRESSURE: 71 MMHG | HEART RATE: 109 BPM | HEIGHT: 63 IN

## 2024-01-03 DIAGNOSIS — M23.91 INTERNAL DERANGEMENT OF RIGHT KNEE: ICD-10-CM

## 2024-01-03 DIAGNOSIS — M17.11 PRIMARY OSTEOARTHRITIS OF RIGHT KNEE: Primary | ICD-10-CM

## 2024-01-03 PROCEDURE — 99213 OFFICE O/P EST LOW 20 MIN: CPT | Performed by: STUDENT IN AN ORGANIZED HEALTH CARE EDUCATION/TRAINING PROGRAM

## 2024-01-03 RX ORDER — AZITHROMYCIN 250 MG/1
TABLET, FILM COATED ORAL
COMMUNITY
Start: 2023-11-24

## 2024-01-03 RX ORDER — BENZONATATE 200 MG/1
CAPSULE ORAL
COMMUNITY
Start: 2023-11-24

## 2024-01-03 NOTE — PROGRESS NOTES
[facility] Sports Medicine Knee Follow Up Visit     Assesment:     58 y.o. female right knee osteoarthritis with continued pain following cortisone injection concerning for meniscal tear    Plan:  The patient's diagnosis and treatment were discussed at length today. We discussed no treatment, non-operative treatment, and operative treatment.    Jasmine presents today for follow-up evaluation right knee osteoarthritis with continued pain following cortisone injection.  Since she had no significant improvement of her symptoms following the cortisone injection and does demonstrate significant tenderness to palpation along the medial joint line I would like to obtain an MRI for further evaluation of internal derangement.  Right knee MRI was ordered at today's visit.  I also provided her with a work note stating that she should remain out of work for an additional 2 to 3 weeks till after review of her MRI, which at that time we will have more answers as well as can provide updated work restrictions.  I did briefly discuss with her possibility of attempting a viscosupplementation injection, however she would just like to obtain an MRI at today's visit.  She can continue with her home exercise program as tolerated.  She should continue use of her short hinged knee brace.  She may continue ice and over-the-counter medication as needed for pain relief.  She is to follow-up after her MRI for review results.    Conservative treatment:    Ice to knee for 20 minutes at least 1-2 times daily.  OTC NSAIDS prn for pain.  Continue use of short hinged knee brace.  Continue outpatient physical therapy/home exercise program.  Updated work restrictions provided at today's visit.    Imaging:    All imaging from today was reviewed by myself and explained to the patient.   We will obtain an MRI of the knee to rule out internal derangement.  Follow up in 1-2 weeks for MRI review. Will make a definitive treatment plan based on the results of the  MRI.      Injection:    No Injection planned at this time.      Surgery:     No surgery is recommended at this point, continue with conservative treatment plan as noted.    Follow up:    Return for results following MRI.        Chief Complaint   Patient presents with    Right Knee - Follow-up       History of Present Illness:    The patient is returns for follow up of right knee pain.  She states that the cortisone injection that she received on 12/13/2023 did not provide her any significant long-lasting relief of her symptoms and she feels like it only provided relief for approximately 1 to 2 days.  She states that she continues to exhibit sharp and severe medial knee pain which she rates a 8 out of 10.  She continues to ambulate with the assistance of a walker.  She has been compliant with her home exercise program.  She denies any instability.  But does occasionally get some clicking and catching sensation.  She is currently managing her pain with ice and over-the-counter medication, however does not provide any significant improvement of her symptoms.  She denies any new injury or trauma.           Knee Surgical History:  None    Past Medical, Social and Family History:  Past Medical History:   Diagnosis Date    Hypertension      History reviewed. No pertinent surgical history.  Allergies   Allergen Reactions    Penicillins Throat Swelling     Current Outpatient Medications on File Prior to Visit   Medication Sig Dispense Refill    amLODIPine-valsartan (EXFORGE) 5-320 MG per tablet Take 1 tablet by mouth daily      azithromycin (ZITHROMAX) 250 mg tablet TAKE 2 TABLETS BY MOUTH ON DAY 1, AND THEN TAKE 1 TABLET BY MOUTH ONCE A DAY ON DAY 2 THROUGH DAY 5      benzonatate (TESSALON) 200 MG capsule TAKE 1 CAPSULE BY MOUTH THREE TIMES DAILY AS NEEDED FOR COUGH FOR UP TO 7 DAYS      Multiple Vitamins-Minerals (WOMENS MULTIVITAMIN PO) Take by mouth      nirmatrelvir & ritonavir (Paxlovid) tablet therapy pack Take  nirmatrelvir 300 mg (two 150 mg tablets) with ritonavir 100 mg (one 100 mg tablet) by mouth twice daily for 5 days.  All 3 (three) tablets should be taken together for each dose.      albuterol (PROVENTIL HFA,VENTOLIN HFA) 90 mcg/act inhaler Inhale 2 puffs every 6 (six) hours as needed for wheezing (Patient not taking: Reported on 1/3/2024)      Calcium Citrate-Vitamin D (CALCIUM + D PO) Take by mouth (Patient not taking: Reported on 3/17/2023)      levothyroxine 25 mcg tablet Take 25 mcg by mouth daily (Patient not taking: Reported on 12/13/2023)      meloxicam (MOBIC) 15 mg tablet Take 15 mg by mouth daily (Patient not taking: Reported on 12/13/2023)      nicotine (NICODERM CQ) 14 mg/24hr TD 24 hr patch Place 1 patch on the skin every 24 hours      rizatriptan (MAXALT) 10 mg tablet Take 10 mg by mouth as needed for migraine Take at the onset of migraine; if symptoms continue or return, may take another dose at least 2 hours after first dose. Take no more than 2 doses in a day. (Patient not taking: Reported on 12/13/2023)      rosuvastatin (CRESTOR) 5 mg tablet Take 5 mg by mouth daily (Patient not taking: Reported on 12/13/2023)      triamcinolone (KENALOG) 0.1 % oral topical paste Apply 1 applicator to teeth 2 (two) times a day      [DISCONTINUED] Fluticasone-Salmeterol (Wixela Inhub) 250-50 mcg/dose inhaler Inhale 1 puff 2 (two) times a day Rinse mouth after use. 60 blister 5    [DISCONTINUED] oxyCODONE (Roxicodone) 5 immediate release tablet Take 1 tablet (5 mg total) by mouth every 6 (six) hours as needed for severe pain for up to 10 doses Max Daily Amount: 20 mg 10 tablet 0     No current facility-administered medications on file prior to visit.     Social History     Socioeconomic History    Marital status: /Civil Union     Spouse name: Not on file    Number of children: Not on file    Years of education: Not on file    Highest education level: Not on file   Occupational History    Not on file    Tobacco Use    Smoking status: Some Days     Current packs/day: 0.50     Average packs/day: 0.5 packs/day for 42.0 years (21.0 ttl pk-yrs)     Types: Cigarettes     Start date: 1/1/1982    Smokeless tobacco: Never    Tobacco comments:     Down to 3 cigs a day 5/2023   Vaping Use    Vaping status: Never Used   Substance and Sexual Activity    Alcohol use: Yes     Comment: Social    Drug use: Yes     Types: Marijuana     Comment: Occasionally… migraine    Sexual activity: Yes     Partners: Male   Other Topics Concern    Not on file   Social History Narrative    Not on file     Social Determinants of Health     Financial Resource Strain: Not At Risk (10/4/2023)    Received from [facility]    Financial Resource Strain     In the last 12 months did you skip medications to save money?: No     In the last 12 months, was there a time when you needed to see a doctor but could not because of cost?: No   Food Insecurity: Not At Risk (10/4/2023)    Received from [facility]    Food Insecurity     In the last 12 months did you ever eat less than you felt you should because there wasn't enough money for food?: No   Transportation Needs: Not At Risk (10/4/2023)    Received from [facility]    Transporation     In the last 12 months, have you ever had to go without healthcare because you didn't have a way to get there?: No   Physical Activity: Not on file   Stress: Not on file   Social Connections: Not At Risk (10/4/2023)    Received from [facility]    Social Connections     Do you often feel lonely?: No   Intimate Partner Violence: Not on file   Housing Stability: Not At Risk (10/4/2023)    Received from [facility]    Housing Stability     Are you worried that in the next 2 months you may not have stable housing?: No         I have reviewed the past medical, surgical, social and family history,  "medications and allergies as documented in the EMR.    Review of systems: ROS is negative other than that noted in the HPI.  Constitutional: Negative for fatigue and fever.      Physical Exam:    Blood pressure 106/71, pulse (!) 109, resp. rate 16, height 5' 3\" (1.6 m), weight 111 kg (245 lb).    General/Constitutional: NAD, well developed, well nourished  HENT: Normocephalic, atraumatic  CV: Intact distal pulses, regular rate  Resp: No respiratory distress or labored breathing  Lymphatic: No lymphadenopathy palpated  Neuro: Alert and Oriented x 3, no focal deficits  Psych: Normal mood, normal affect, normal judgement, normal behavior  Skin: Warm, dry, no rashes, no erythema      Knee Exam (focused):  Visual inspection of the Right knee demonstrates normal contour without atrophy.   No previous incisions   There is no significant erythema or edema.    No significant joint effusion   Range of motion is full from 0-130 degrees of flexion   Able to straight leg raise   No patellar tender to palpation  Moderate medial joint line tenderness, Minimal lateral joint line tenderness  + medial Shakila's, - lateral Shakila's  1A Lachman exam, Stable posterior drawer  - dial test  Stable to varus and valgus stress at both 0 and 30°  Patella tracks normally.  No J sign.  No apprehension.  Translation is approximately 2 quadrants and is equal to the contralateral side  Patellar eversion is similar to the contralateral side    Examination of the patient's ipsilateral hip demonstrates full painless range of motion.  No crepitus.           LE NV Exam: +2 DP/PT pulses bilaterally  Sensation intact to light touch L2-S1 bilaterally    No calf tenderness to palpation bilaterally      Knee Imaging    No imaging was performed today      Scribe Attestation      I,:  Suraj Adams am acting as a scribe while in the presence of the attending physician.:       I,:  Dhruv Nuñez, DO personally performed the services described in this " documentation    as scribed in my presence.:

## 2024-01-03 NOTE — LETTER
January 3, 2024     Patient: Nicki Carballo  YOB: 1965  Date of Visit: 1/3/2024      To Whom it May Concern:    Nicki Carballo is under my professional care. Nicki was seen in my office on 1/3/2024. Nicki should remain out of work until after review of her MRI which will occur in approximately 2-3 weeks. At that time she will have a follow up visit and updated work restrictions will be provided.    If you have any questions or concerns, please don't hesitate to call.         Sincerely,          Dhruv Nuñez,         CC: No Recipients

## 2024-01-09 ENCOUNTER — TELEPHONE (OUTPATIENT)
Age: 59
End: 2024-01-09

## 2024-01-09 NOTE — TELEPHONE ENCOUNTER
Caller: Ancelmo - PCP Family Practice Referral Coordinator    Doctor: Michelle    Reason for call: Ancelmo called stating the patient's [place] insurance would require her to have her ordered MRI at [facility], but patient is requesting to have the MRI done at [facility].  Ancelmo said this can be done, but the current authorization would need to be withdrawn and a new referral created.    Ancelmo is trying to help patient with MRI approval at [facility] unless Dr. Nuñez would prefer the MRI be completed at [place].    Please contact Ancelmo with questions or concerns.    Call back#: [phone]

## 2024-01-10 NOTE — TELEPHONE ENCOUNTER
Unfortunately with her insurance she has to go where her insurance tells her to go, unless it's life or death kind of thing.

## 2024-03-27 ENCOUNTER — OFFICE VISIT (OUTPATIENT)
Dept: OBGYN CLINIC | Facility: CLINIC | Age: 59
End: 2024-03-27
Payer: COMMERCIAL

## 2024-03-27 VITALS
BODY MASS INDEX: 43.41 KG/M2 | WEIGHT: 245 LBS | HEIGHT: 63 IN | SYSTOLIC BLOOD PRESSURE: 121 MMHG | RESPIRATION RATE: 16 BRPM | HEART RATE: 102 BPM | DIASTOLIC BLOOD PRESSURE: 86 MMHG

## 2024-03-27 DIAGNOSIS — M17.11 PRIMARY OSTEOARTHRITIS OF RIGHT KNEE: Primary | ICD-10-CM

## 2024-03-27 PROCEDURE — 99213 OFFICE O/P EST LOW 20 MIN: CPT | Performed by: STUDENT IN AN ORGANIZED HEALTH CARE EDUCATION/TRAINING PROGRAM

## 2024-03-27 RX ORDER — METHYLPREDNISOLONE 4 MG/1
TABLET ORAL
COMMUNITY
Start: 2024-03-19

## 2024-03-27 NOTE — PROGRESS NOTES
Ortho Sports Medicine Knee Follow Up Visit     Assesment:     59 y.o. female right knee medial compartment osteoarthritis    Plan:  The patient's diagnosis and treatment were discussed at length today. We discussed no treatment, non-operative treatment, and operative treatment.    Jasmine presents today for follow-up evaluation right knee medial compartment osteoarthritis and MRI review.  I did review her MRI at today's visit that demonstrates no evidence of meniscus tear.  I did discuss with her that there is a small area of cartilage wear along her medial femoral condyle that may be contributing to her pain.  I discussed with her that given her improvement of symptoms I would continue with nonsurgical intervention.  I did discuss with her that if her pain returns we can repeat the cortisone injection or attempt viscosupplementation.  She can continue to perform activity as tolerated using pain as a guide.  I did review home exercise program with her at today's visit.  She can continue to work as tolerated without any restrictions.  She can follow-up with me on an as-needed basis.    Conservative treatment:    Ice to knee for 20 minutes at least 1-2 times daily.  OTC NSAIDS prn for pain.  Let pain guide gradual return activities.  Home exercise program reviewed.    Imaging:    All imaging from today was reviewed by myself and explained to the patient.       Injection:    No Injection planned at this time.      Surgery:     No surgery is recommended at this point, continue with conservative treatment plan as noted.    Follow up:    Return if symptoms worsen or fail to improve.        Chief Complaint   Patient presents with    Right Knee - Follow-up     mRI REview       History of Present Illness:    The patient is returns for follow up of Right knee pain.  She states that her knee pain is improving, however she still does have some anterior medial knee pain from time to time.  She mentions that this is worse with  twisting as well as going up and down steps and prolonged walking.  She describes her pain as dull and achy and rates it a 4-10.  She states that the cortisone injection that she received in December did provide her significant relief of her symptoms.  She mentions that she is ambulating without the walker, however she does leave it at work just in case it flares up on her.  She denies any instability.  She denies mechanical symptoms catching or locking.  She denies any new injury or trauma to her knee.  She denies any numbness or tingling.      Knee Surgical History:  None    Past Medical, Social and Family History:  Past Medical History:   Diagnosis Date    Hypertension      History reviewed. No pertinent surgical history.  Allergies   Allergen Reactions    Penicillins Throat Swelling     Current Outpatient Medications on File Prior to Visit   Medication Sig Dispense Refill    amLODIPine-valsartan (EXFORGE) 5-320 MG per tablet Take 1 tablet by mouth daily      azithromycin (ZITHROMAX) 250 mg tablet TAKE 2 TABLETS BY MOUTH ON DAY 1, AND THEN TAKE 1 TABLET BY MOUTH ONCE A DAY ON DAY 2 THROUGH DAY 5      benzonatate (TESSALON) 200 MG capsule TAKE 1 CAPSULE BY MOUTH THREE TIMES DAILY AS NEEDED FOR COUGH FOR UP TO 7 DAYS      methylPREDNISolone 4 MG tablet therapy pack FOLLOW PACKAGE INSTRUCTIONS      Multiple Vitamins-Minerals (WOMENS MULTIVITAMIN PO) Take by mouth      nirmatrelvir & ritonavir (Paxlovid) tablet therapy pack Take nirmatrelvir 300 mg (two 150 mg tablets) with ritonavir 100 mg (one 100 mg tablet) by mouth twice daily for 5 days.  All 3 (three) tablets should be taken together for each dose.      albuterol (PROVENTIL HFA,VENTOLIN HFA) 90 mcg/act inhaler Inhale 2 puffs every 6 (six) hours as needed for wheezing (Patient not taking: Reported on 1/3/2024)      Calcium Citrate-Vitamin D (CALCIUM + D PO) Take by mouth (Patient not taking: Reported on 3/17/2023)      levothyroxine 25 mcg tablet Take 25 mcg by  mouth daily (Patient not taking: Reported on 12/13/2023)      meloxicam (MOBIC) 15 mg tablet Take 15 mg by mouth daily (Patient not taking: Reported on 12/13/2023)      nicotine (NICODERM CQ) 14 mg/24hr TD 24 hr patch Place 1 patch on the skin every 24 hours      rizatriptan (MAXALT) 10 mg tablet Take 10 mg by mouth as needed for migraine Take at the onset of migraine; if symptoms continue or return, may take another dose at least 2 hours after first dose. Take no more than 2 doses in a day. (Patient not taking: Reported on 12/13/2023)      rosuvastatin (CRESTOR) 5 mg tablet Take 5 mg by mouth daily (Patient not taking: Reported on 12/13/2023)      triamcinolone (KENALOG) 0.1 % oral topical paste Apply 1 applicator to teeth 2 (two) times a day       No current facility-administered medications on file prior to visit.     Social History     Socioeconomic History    Marital status: /Civil Union     Spouse name: Not on file    Number of children: Not on file    Years of education: Not on file    Highest education level: Not on file   Occupational History    Not on file   Tobacco Use    Smoking status: Some Days     Current packs/day: 0.50     Average packs/day: 0.5 packs/day for 42.2 years (21.1 ttl pk-yrs)     Types: Cigarettes     Start date: 1/1/1982    Smokeless tobacco: Never    Tobacco comments:     Down to 3 cigs a day 5/2023   Vaping Use    Vaping status: Never Used   Substance and Sexual Activity    Alcohol use: Yes     Comment: Social    Drug use: Yes     Types: Marijuana     Comment: Occasionally… migraine    Sexual activity: Yes     Partners: Male   Other Topics Concern    Not on file   Social History Narrative    Not on file     Social Determinants of Health     Financial Resource Strain: Not At Risk (10/4/2023)    Received from [facility]    Financial Resource Strain     In the last 12 months did you skip medications to save money?: No     In the last 12 months, was there  "a time when you needed to see a doctor but could not because of cost?: No   Food Insecurity: Not At Risk (10/4/2023)    Received from [facility]    Food Insecurity     In the last 12 months did you ever eat less than you felt you should because there wasn't enough money for food?: No   Transportation Needs: Not At Risk (10/4/2023)    Received from [facility]    Transporation     In the last 12 months, have you ever had to go without healthcare because you didn't have a way to get there?: No   Physical Activity: Not on file   Stress: Not on file   Social Connections: Not At Risk (10/4/2023)    Received from [facility]    Social Connections     Do you often feel lonely?: No   Intimate Partner Violence: Not on file   Housing Stability: Not At Risk (10/4/2023)    Received from [facility]    Housing Stability     Are you worried that in the next 2 months you may not have stable housing?: No         I have reviewed the past medical, surgical, social and family history, medications and allergies as documented in the EMR.    Review of systems: ROS is negative other than that noted in the HPI.  Constitutional: Negative for fatigue and fever.      Physical Exam:    Blood pressure 121/86, pulse 102, resp. rate 16, height 5' 3\" (1.6 m), weight 111 kg (245 lb).    General/Constitutional: NAD, well developed, well nourished  HENT: Normocephalic, atraumatic  CV: Intact distal pulses, regular rate  Resp: No respiratory distress or labored breathing  Lymphatic: No lymphadenopathy palpated  Neuro: Alert and Oriented x 3, no focal deficits  Psych: Normal mood, normal affect, normal judgement, normal behavior  Skin: Warm, dry, no rashes, no erythema      Knee Exam (focused):  Visual inspection of the Right knee demonstrates normal contour without atrophy.   No previous incisions   There is no significant erythema or edema.    No " significant joint effusion   Range of motion is full from 0-130 degrees of flexion   Able to straight leg raise   No patellar tender to palpation. Medial femoral condyle tenderness to palpation.  + medial joint line tenderness, - lateral joint line tenderness  - medial Shakila's, - lateral Shakila's  1A Lachman exam, Stable posterior drawer  - dial test  Stable to varus and valgus stress at both 0 and 30°  Patella tracks normally.  No J sign.  No apprehension.  Translation is approximately 2 quadrants and is equal to the contralateral side  Patellar eversion is similar to the contralateral side    Examination of the patient's ipsilateral hip demonstrates full painless range of motion.  No crepitus.           LE NV Exam: +2 DP/PT pulses bilaterally  Sensation intact to light touch L2-S1 bilaterally    No calf tenderness to palpation bilaterally      Knee Imaging    MRI of right knee was reviewed, which demonstrates no evidence of meniscus tear.  Cruciates and collaterals appear intact.  There is a small focal area along the medial femoral condyle with associated cartilage loss measuring 4 x 6 mm with associated marrow edema.  I reviewed and agree with the radiologist interpretation.      Scribe Attestation      I,:  Suraj Adams am acting as a scribe while in the presence of the attending physician.:       I,:  Dhruv Nuñez, DO personally performed the services described in this documentation    as scribed in my presence.:

## 2024-05-31 LAB

## 2024-06-03 LAB

## 2024-10-29 ENCOUNTER — OFFICE VISIT (OUTPATIENT)
Dept: OBGYN CLINIC | Facility: CLINIC | Age: 59
End: 2024-10-29
Payer: COMMERCIAL

## 2024-10-29 VITALS
BODY MASS INDEX: 41.99 KG/M2 | HEIGHT: 63 IN | SYSTOLIC BLOOD PRESSURE: 124 MMHG | DIASTOLIC BLOOD PRESSURE: 76 MMHG | WEIGHT: 237 LBS

## 2024-10-29 DIAGNOSIS — Z01.419 ENCOUNTER FOR GYNECOLOGICAL EXAMINATION (GENERAL) (ROUTINE) WITHOUT ABNORMAL FINDINGS: Primary | ICD-10-CM

## 2024-10-29 DIAGNOSIS — Z12.4 SCREENING FOR CERVICAL CANCER: ICD-10-CM

## 2024-10-29 DIAGNOSIS — N39.3 SUI (STRESS URINARY INCONTINENCE, FEMALE): ICD-10-CM

## 2024-10-29 PROBLEM — E66.01 OBESITY, MORBID, BMI 40.0-49.9 (HCC): Chronic | Status: ACTIVE | Noted: 2023-02-23

## 2024-10-29 PROBLEM — F17.210 CIGARETTE NICOTINE DEPENDENCE WITHOUT COMPLICATION: Status: ACTIVE | Noted: 2022-12-27

## 2024-10-29 PROBLEM — J44.9 CHRONIC OBSTRUCTIVE PULMONARY DISEASE (HCC): Status: ACTIVE | Noted: 2024-10-29

## 2024-10-29 PROBLEM — E03.9 HYPOTHYROIDISM: Status: ACTIVE | Noted: 2023-10-06

## 2024-10-29 PROBLEM — R91.1 PULMONARY NODULE: Status: ACTIVE | Noted: 2024-10-29

## 2024-10-29 PROBLEM — K76.0 FATTY LIVER: Status: ACTIVE | Noted: 2021-07-21

## 2024-10-29 PROBLEM — G43.909 MIGRAINE WITHOUT STATUS MIGRAINOSUS, NOT INTRACTABLE: Status: ACTIVE | Noted: 2021-07-21

## 2024-10-29 PROCEDURE — G0476 HPV COMBO ASSAY CA SCREEN: HCPCS | Performed by: PHYSICIAN ASSISTANT

## 2024-10-29 PROCEDURE — S0610 ANNUAL GYNECOLOGICAL EXAMINA: HCPCS | Performed by: PHYSICIAN ASSISTANT

## 2024-10-29 PROCEDURE — G0145 SCR C/V CYTO,THINLAYER,RESCR: HCPCS | Performed by: PHYSICIAN ASSISTANT

## 2024-10-29 NOTE — PROGRESS NOTES
ASSESSMENT & PLAN: Nicki Carballo is a 59 y.o.  with normal gynecologic exam.    1.  Routine well woman exam done today  2.  Pap and HPV:  The patient's last pap and hpv was unknown.    It was normal per patient.    Pap and cotesting was done today.    Current ASCCP Guidelines reviewed.   3.  Mammogram UTD 2023 [facility], had scheduled for next month  4.  Colon ca screen completed through [facility] - negative . Due again   5. The following were reviewed in today's visit: breast self exam, menopause, adequate intake of calcium and vitamin D, exercise, healthy diet, efforts for weight loss and age appropriate recommendations regarding screenings and prevention.  6.  Patient noting chronic symptoms of mild EDNA.  Was worse with worsening cough with recent URI but symptoms have since stabilized.  She does note doing Kegels which have helped.  Symptoms are mild at present.  Encouraged weight loss.  Kegel exercises again discussed and reviewed with her.  Lifestyle modification with reducing caffeine, acidic/citrus as well as regular bladder emptying regimen were all reviewed.  Can consider formal pelvic floor PT if progresses.    RTO 1 year annual exam, sooner if problems arise in the interim.    CC:  Annual Gynecologic Examination    HPI: Nicki Carballo is a 59 y.o.  who presents for annual gynecologic examination.  She is a new patient with our office.    She has the following concerns:  initially pt was scheduled for pelvic pain, but had CT scan w/ contrast of abd/pelvis and was normal.   Since that time her pain has resolved.  She had no recurrent sxs and feels well.  She denies any associated symptoms that occurred with her pelvic pain such as urinary issues, vaginal symptoms, postmenopausal bleeding or bowel issues.    Pre-existing medical conditions include hypertension, hyperlipidemia, tobacco use, obesity, hypothyroidism, asthma, arthritis, HENRY.    Healthy diet Yes; follows diabetic  "diet because of her  but occasional fast food.   Vitamins Yes; MVI  Exercise No; active want work but no formal exercise program. She is active on the weekend outdoors.     No LMP recorded. Patient is postmenopausal.    Post-menopausal bleeding: none    Bowel or bladder changes: denies changes.   She does leak urine if coughing/laughing abruptly.  Otherwise no hematuria/frequency/urgency/dysuria.  She does drink coffee in AM.       Health Maintenance:      She does perform irregular monthly self breast exams.  Denies breast pain, lump, skin change or nipple discharge.    She feels safe at home.  She feels safe with her partner.    Last Pap: unknown.   Last mammogram: 2023  Last colonoscopy: cologuard 10/2024    Past Medical History:   Diagnosis Date    Ectopic pregnancy     Hypertension        Past Surgical History:   Procedure Laterality Date    ECTOPIC PREGNANCY SURGERY      \"coroners ectopic pregnancy\"       Past OB/Gyn History:  OB History          4    Para   1    Term   1            AB   3    Living   1         SAB   2    IAB        Ectopic   1    Multiple        Live Births                   History of sexually transmitted infection: denies.  History of abnormal pap smears: denies .    Patient is currently sexually active.  Monogamous with partner.    The current method of family planning is post menopausal status.    Family History   Problem Relation Age of Onset    Lung cancer Paternal Grandmother     Ovarian cancer Neg Hx     Colon cancer Neg Hx     Breast cancer Neg Hx        Family history of Breast/Uterine/Ovarian/Colon Cancer: none    Social History:  Social History     Socioeconomic History    Marital status: /Civil Union     Spouse name: Not on file    Number of children: Not on file    Years of education: Not on file    Highest education level: Not on file   Occupational History    Not on file   Tobacco Use    Smoking status: Some Days     Current packs/day: 0.50     " Average packs/day: 0.5 packs/day for 42.8 years (21.4 ttl pk-yrs)     Types: Cigarettes     Start date: 1/1/1982    Smokeless tobacco: Never    Tobacco comments:     Down to 3 cigs a day 5/2023   Vaping Use    Vaping status: Never Used   Substance and Sexual Activity    Alcohol use: Yes     Comment: Social    Drug use: Yes     Types: Marijuana     Comment: Occasionally… migraine    Sexual activity: Yes     Partners: Male   Other Topics Concern    Not on file   Social History Narrative    Not on file     Social Determinants of Health     Financial Resource Strain: Not At Risk (10/8/2024)    Received from [facility]    Financial Resource Strain     In the last 12 months did you skip medications to save money?: No     In the last 12 months, was there a time when you needed to see a doctor but could not because of cost?: No   Food Insecurity: Not At Risk (10/8/2024)    Received from [facility]    Food Insecurity     In the last 12 months did you ever eat less than you felt you should because there wasn't enough money for food?: No   Transportation Needs: Not At Risk (10/8/2024)    Received from [facility]    Transporation     In the last 12 months, have you ever had to go without healthcare because you didn't have a way to get there?: No   Physical Activity: Not on file   Stress: Not on file   Social Connections: Not At Risk (10/8/2024)    Received from [facility]    Social Connections     Do you often feel lonely?: No   Intimate Partner Violence: Not on file   Housing Stability: Not At Risk (10/8/2024)    Received from [facility]    Housing Stability     Are you worried that in the next 2 months you may not have stable housing?: No       Allergies   Allergen Reactions    Penicillins Throat Swelling       Current Outpatient Medications:     albuterol (PROVENTIL HFA,VENTOLIN HFA) 90 mcg/act  "inhaler, Inhale 2 puffs every 6 (six) hours as needed for wheezing, Disp: , Rfl:     amLODIPine-valsartan (EXFORGE) 5-320 MG per tablet, Take 1 tablet by mouth daily, Disp: , Rfl:     levothyroxine 25 mcg tablet, Take 25 mcg by mouth daily, Disp: , Rfl:     Multiple Vitamins-Minerals (WOMENS MULTIVITAMIN PO), Take by mouth, Disp: , Rfl:     rosuvastatin (CRESTOR) 5 mg tablet, Take 5 mg by mouth daily, Disp: , Rfl:       Review of Systems  Constitutional :no fever, feels well, no tiredness, no recent weight gain or loss  ENT: no ear ache, no loss of hearing, no nosebleeds or nasal discharge, no sore throat or hoarseness.  Cardiovascular: no complaints of slow or fast heart beat, no chest pain, no palpitations, no leg claudication or lower extremity edema.  Respiratory: no complaints of worsening shortness of shortness of breath, no PETIT  Breasts:no complaints of breast pain, breast lump, or nipple discharge  Gastrointestinal: no complaints of abdominal pain, constipation, nausea, vomiting, or diarrhea or bloody stools  Genitourinary : EDNA. no complaints of dysuria,  pelvic pain, vaginal discharge/itch/odor/dryness or postmenopausal bleeding.  Musculoskeletal: Pre-existing arthritic pains.    Integumentary: no complaints of skin rash or lesion, itching or dry skin  Neurological: no complaints of headache, no confusion, no numbness or tingling, no dizziness or fainting.  Mental health: Denies anxiety or depression symptoms.    Objective      /76 (BP Location: Left arm, Patient Position: Sitting, Cuff Size: Adult)   Ht 5' 3\" (1.6 m)   Wt 108 kg (237 lb)   BMI 41.98 kg/m²     General:   appears stated age, cooperative, alert normal mood and affect.  BMI 41.90   Neck: normal, supple,trachea midline, no masses.  Thyroid palpated normal.   Heart: regular rate and rhythm, S1, S2 normal, no murmur, click, rub or gallop   Lungs: clear to auscultation bilaterally   Breasts: normal appearance, no masses or tenderness, No " nipple retraction or dimpling, No nipple discharge or bleeding, No axillary or supraclavicular adenopathy, Normal to palpation without dominant masses   Abdomen: soft, non-tender, without masses or organomegaly   Vulva: normal female genitalia, no lesions   Vagina: normal vagina, no discharge, exudate, lesion, or erythema   Urethra: normal   Cervix: Normal, no discharge. PAP done. Nontender.   Uterus: Nontender   Adnexa: Nontender bilateral   Lymphatic palpation of lymph nodes in neck, axilla, groin and/or other locations: no lymphadenopathy or masses noted   Skin normal skin turgor and no rashes.   Psychiatric orientation to person, place, and time: normal. mood and affect: normal

## 2024-10-30 LAB
HPV HR 12 DNA CVX QL NAA+PROBE: NEGATIVE
HPV16 DNA CVX QL NAA+PROBE: NEGATIVE
HPV18 DNA CVX QL NAA+PROBE: NEGATIVE

## 2024-11-01 LAB
LAB AP GYN PRIMARY INTERPRETATION: NORMAL
Lab: NORMAL